# Patient Record
Sex: FEMALE | Race: BLACK OR AFRICAN AMERICAN | NOT HISPANIC OR LATINO | Employment: FULL TIME
[De-identification: names, ages, dates, MRNs, and addresses within clinical notes are randomized per-mention and may not be internally consistent; named-entity substitution may affect disease eponyms.]

---

## 2017-06-24 ENCOUNTER — HEALTH MAINTENANCE LETTER (OUTPATIENT)
Age: 47
End: 2017-06-24

## 2019-08-20 ENCOUNTER — TRANSFERRED RECORDS (OUTPATIENT)
Dept: MULTI SPECIALTY CLINIC | Facility: CLINIC | Age: 49
End: 2019-08-20

## 2019-10-02 ENCOUNTER — HEALTH MAINTENANCE LETTER (OUTPATIENT)
Age: 49
End: 2019-10-02

## 2020-06-22 ENCOUNTER — TELEPHONE (OUTPATIENT)
Dept: ENDOCRINOLOGY | Facility: CLINIC | Age: 50
End: 2020-06-22

## 2020-06-26 ENCOUNTER — TELEPHONE (OUTPATIENT)
Dept: ENDOCRINOLOGY | Facility: CLINIC | Age: 50
End: 2020-06-26

## 2020-06-26 NOTE — TELEPHONE ENCOUNTER
Called in regards to appts on Monday June 29th. Instructed patient to complete questionnaire on [employer] prior to visit.  [phone] as callback  Shauna Miranda EMT

## 2020-06-29 ENCOUNTER — VIRTUAL VISIT (OUTPATIENT)
Dept: ENDOCRINOLOGY | Facility: CLINIC | Age: 50
End: 2020-06-29
Payer: COMMERCIAL

## 2020-06-29 ENCOUNTER — VIRTUAL VISIT (OUTPATIENT)
Dept: SURGERY | Facility: CLINIC | Age: 50
End: 2020-06-29
Payer: COMMERCIAL

## 2020-06-29 VITALS — WEIGHT: 227 LBS | BODY MASS INDEX: 36.48 KG/M2 | HEIGHT: 66 IN

## 2020-06-29 DIAGNOSIS — E66.812 CLASS 2 SEVERE OBESITY WITH SERIOUS COMORBIDITY AND BODY MASS INDEX (BMI) OF 37.0 TO 37.9 IN ADULT, UNSPECIFIED OBESITY TYPE (H): Primary | ICD-10-CM

## 2020-06-29 DIAGNOSIS — E66.9 OBESITY (BMI 30-39.9): Primary | ICD-10-CM

## 2020-06-29 DIAGNOSIS — E66.01 CLASS 2 SEVERE OBESITY WITH SERIOUS COMORBIDITY AND BODY MASS INDEX (BMI) OF 37.0 TO 37.9 IN ADULT, UNSPECIFIED OBESITY TYPE (H): Primary | ICD-10-CM

## 2020-06-29 RX ORDER — TOPIRAMATE 25 MG/1
TABLET, FILM COATED ORAL
Qty: 90 TABLET | Refills: 3 | Status: SHIPPED | OUTPATIENT
Start: 2020-06-29 | End: 2020-09-14

## 2020-06-29 RX ORDER — LISINOPRIL AND HYDROCHLOROTHIAZIDE 12.5; 2 MG/1; MG/1
1 TABLET ORAL DAILY
COMMUNITY
Start: 2019-11-20 | End: 2024-02-06

## 2020-06-29 ASSESSMENT — PAIN SCALES - GENERAL: PAINLEVEL: NO PAIN (0)

## 2020-06-29 ASSESSMENT — MIFFLIN-ST. JEOR: SCORE: 1658.48

## 2020-06-29 NOTE — LETTER
"6/29/2020       RE: Melyssa Kirk  [address] [place] [address]     Dear Colleague,    Thank you for referring your patient, Melyssa Kirk, to the [facility] SURGICAL WEIGHT MANAGEMENT [facility]. Please see a copy of my visit note below.    Melyssa Kirk is a 49 year old female who is being evaluated via a billable telephone visit.        Phone call duration: 24 minutes    New Weight Management Nutrition Consultation    Melyssa Kirk is a 49 year old female presents today for new weight management nutrition consultation.  Patient referred by  on June 29, 2020.    Patient with Co-morbidities of obesity including:  Type II DM no  Renal Failure no  Sleep apnea no  Hypertension yes   Dyslipidemia no  Joint pain no  Back pain no  GERD no     Anthropometrics:  Estimated body mass index is 37.2 kg/m  as calculated from the following:    Height as of an earlier encounter on 6/29/20: 1.664 m (5' 5.5\").    Weight as of an earlier encounter on 6/29/20: 103 kg (227 lb).    Was around 170 lb: food addicts anonmym 3 meals a day no flour or sugar kept that off for 5 year.   2012 moved back to [place] gain ~70 lbs    Highest weigh was 350 lb (probably more stopped weighting herself)     Medications for Weight Loss:  Topamax    NUTRITION HISTORY  See MD note for details.    She reports consuming: 3 meals a day and a snack  Still weight and measure her food, example her dinner last night was 5 oz of salmon, 1 cup of brocolli and a cup of raw vegetable  1/4 brown rice.   She tracks her calories with [employer]itnesspal: ~1600 calories per day.     Recent food recall per MD  3 meals a day and 1 snack.   AM 8-9 am: protein (eggs) + yogurt, fruit, grain  lunch: prot, veg/salad, grain  Dinner 6:30 pm: Protein and veg  Snack: protein, or fruit (3 pm)     Physical Activity:  30-45 min (walk/jog) 3 days a week. Other days: cardio. + strength training. Just started back on yoga 2 times a " week.     MALNUTRITION  Telephone Visit: unable to perform NFPE  % Intake: Unable to assess  % Weight Loss: Unable to assess  Subcutaneous Fat Loss: None observed  Muscle Loss: None observed  Fluid Accumulation/Edema: None noted  Malnutrition Diagnosis: Patient does not meet two of the established criteria necessary for diagnosing malnutrition     Nutrition Prescription  Recommended energy/nutrient modification.  1500 calories/day (per MD)    Nutrition Diagnosis  Obesity r/t long history of self-monitoring deficit and excessive energy intake aeb BMI >30.    Nutrition Intervention  Materials/education provided:   1. Dicussed the volumetric diet  2. Patient asked about meal plans, dicussed the benefits and negatives of meal plans. Will give some meal ideas and plan options.   3. Provided encouragement and support.   4. AVS via [employer]    Patient Understanding: good  Expected Compliance: good  Follow-Up Plans: 1 month      Nutrition Goals  1) Follow 1500 calorie/day plan per MD  2) Skip afternoon snack    3) Continue with current activity level     Follow-Up:  1 month     Phone call contact time:   Call Started at: 1:33  Call Ended at: 1:57 PM    Time spent with patient: 24 minutes.  Rasheeda Calderon, MS, RD, LD

## 2020-06-29 NOTE — PROGRESS NOTES
"Melyssa Kirk is a 49 year old female who is being evaluated via a billable telephone visit.      The patient has been notified of following:     \"This telephone visit will be conducted via a call between you and your physician/provider. We have found that certain health care needs can be provided without the need for a physical exam.  This service lets us provide the care you need with a short phone conversation.  If a prescription is necessary we can send it directly to your pharmacy.  If lab work is needed we can place an order for that and you can then stop by our lab to have the test done at a later time.    Telephone visits are billed at different rates depending on your insurance coverage. During this emergency period, for some insurers they may be billed the same as an in-person visit.  Please reach out to your insurance provider with any questions.    If during the course of the call the physician/provider feels a telephone visit is not appropriate, you will not be charged for this service.\"    Patient has given verbal consent for Telephone visit?  Yes    What phone number would you like to be contacted at? [phone]    How would you like to [facility] your [facility]    Phone call duration: 24 minutes    New Weight Management Nutrition Consultation    Melyssa Kirk is a 49 year old female presents today for new weight management nutrition consultation.  Patient referred by  on June 29, 2020.    Patient with Co-morbidities of obesity including:  Type II DM no  Renal Failure no  Sleep apnea no  Hypertension yes   Dyslipidemia no  Joint pain no  Back pain no  GERD no     Anthropometrics:  Estimated body mass index is 37.2 kg/m  as calculated from the following:    Height as of an earlier encounter on 6/29/20: 1.664 m (5' 5.5\").    Weight as of an earlier encounter on 6/29/20: 103 kg (227 lb).    Was around 170 lb: food addicts anonmym 3 meals a day no flour or sugar kept that off for 5 year. "   2012 moved back to [place] gain ~70 lbs    Highest weigh was 350 lb (probably more stopped weighting herself)     Medications for Weight Loss:  Topamax    NUTRITION HISTORY  See MD note for details.    She reports consuming: 3 meals a day and a snack  Still weight and measure her food, example her dinner last night was 5 oz of salmon, 1 cup of brocolli and a cup of raw vegetable  1/4 brown rice.   She tracks her calories with [employer]: ~1600 calories per day.     Recent food recall per MD  3 meals a day and 1 snack.   AM 8-9 am: protein (eggs) + yogurt, fruit, grain  lunch: prot, veg/salad, grain  Dinner 6:30 pm: Protein and veg  Snack: protein, or fruit (3 pm)     Physical Activity:  30-45 min (walk/jog) 3 days a week. Other days: cardio. + strength training. Just started back on yoga 2 times a week.     MALNUTRITION  Telephone Visit: unable to perform NFPE  % Intake: Unable to assess  % Weight Loss: Unable to assess  Subcutaneous Fat Loss: None observed  Muscle Loss: None observed  Fluid Accumulation/Edema: None noted  Malnutrition Diagnosis: Patient does not meet two of the established criteria necessary for diagnosing malnutrition     Nutrition Prescription  Recommended energy/nutrient modification.  1500 calories/day (per MD)    Nutrition Diagnosis  Obesity r/t long history of self-monitoring deficit and excessive energy intake aeb BMI >30.    Nutrition Intervention  Materials/education provided:   1. Dicussed the volumetric diet  2. Patient asked about meal plans, dicussed the benefits and negatives of meal plans. Will give some meal ideas and plan options.   3. Provided encouragement and support.   4. AVS via [employer]    Patient Understanding: good  Expected Compliance: good  Follow-Up Plans: 1 month      Nutrition Goals  1) Follow 1500 calorie/day plan per MD  2) Skip afternoon snack    3) Continue with current activity level     Follow-Up:  1 month     Phone call contact time:   Call Started at: 1:33  Call  Ended at: 1:57 PM    Time spent with patient: 24 minutes.  Rasheeda Calderon, MS, RD, LD

## 2020-06-29 NOTE — LETTER
"2020       RE: Melyssa Kirk  [address] [place] [address]     Dear Colleague,    Thank you for referring your patient, Melyssa Kirk, to the [facility] MEDICAL WEIGHT MANAGEMENT at [facility]. Please see a copy of my visit note below.    Melyssa Kirk is a 49 year old female who is being evaluated via a billable video visit.      The patient has been notified of following:     \"This video visit will be conducted via a call between you and your physician/provider. We have found that certain health care needs can be provided without the need for an in-person physical exam.  This service lets us provide the care you need with a video conversation.  If a prescription is necessary we can send it directly to your pharmacy.  If lab work is needed we can place an order for that and you can then stop by our lab to have the test done at a later time.    Video visits are billed at different rates depending on your insurance coverage.  Please reach out to your insurance provider with any questions.    If during the course of the call the physician/provider feels a video visit is not appropriate, you will not be charged for this service.\"    Patient has given verbal consent for Video visit? Yes  How would you like to obtain your AVS? MyChart  Patient would like the video invitation sent by: Send to e-mail at: [email or web address]  Will anyone else be joining your video visit? No        Video-Visit Details    Type of service:  Video Visit    Video Start Time: 11:58 AM  Video End Time: 12:43    Originating Location (pt. Location): Home    Distant Location (provider location):   [facility]     Platform used for Video Visit: Gustavo Alejo MD        New Medical Weight Management Consult    PATIENT:  Melyssa Kirk  MRN:         [id number]  :         1970  MILEY: 19    Dear Laine Pradhan,    I had the pleasure of seeing " "your patient, Melyssa Kirk.  Full intake/assessment done to determine barriers to weight loss success and develop a treatment plan.  Melyssa Kirk is a 49 year old female interested in treatment of medical problems associated with weight.      Patient was referred by a friend who comes to this clinic.   She has had issues with her weight all her life. She was \"put on a diet\" by her family even in her childhood. Gained weight in college (got to 220 lbs). Then lost weight to 175 lbs. She then had two pregnancies that she decided to abort both of them, but she gained weight from both. She regained weight, and got to a max of 350 lbs or higher. She was in her late 20s. Then she moved from [place] to [place]. She started monitoring her diet and exercising. She lost down to 260 lbs. Stayed at that weight. Moved to [place], and was able to lost about 120 lbs over the course of a year there. She accomplished that with \"food addicts anonymous\". Went to support group meetings per week. She followed a meal plan and tracked her food daily. Weight went from 272 lbs down to 151 lbs. She maintained at 165-170 lbs for 5 years. Then she moved back to [place]. Started a stressful job. She joined [employer]. It was not the same. She did not get the same level of support. She gained 70 lbs within the year that she moved back ([id number]). She got to 265 lbs.   Now at 227 lbs at the present time.     At the current time, she has a strong support system.   She is a member of [employer].   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     Diet:   3 meals a day and 1 snack.   AM 8-9 am: protein (eggs) + yogurt, fruit, grain  lunch: prot, veg/salad, grain  Dinner 6:30 pm: Protein and veg  Snack: protein, or fruit (3 pm)     She measures her food, she uses [employer]. Typically gets about 1600 Kcal/day    Exercise: 30-45 min (walk/jog) 3 " "days a week. Other days: cardio. + strength training. Just started back on yoga 2 times a week.     She has HTN. Well controlled on lisinipril/HCTZ.       Her weight today is 227 lbs 0 oz, Body mass index is 37.2 kg/m ., and she has the following co-morbidities:     6/28/2020   I have the following health issues associated with obesity: High Blood Pressure   I have the following symptoms associated with obesity: Depression, Fatigue       Patient Goals 6/28/2020   I am interested in having a healthier weight to diminish current health problems: Yes   I am interested in having a healthier weight in order to prevent future health problems: Yes   I am interested in having a healthier weight in order to have a future surgery: No       Referring Provider 6/28/2020   Please name the provider who referred you to Medical Weight Management.  If you do not know, please answer: \"I Don't Know\". n/a referred by a friend Isabel Martinez       Wt Readings from Last 4 Encounters:   06/29/20 103 kg (227 lb)   05/19/15 105.7 kg (233 lb)       Weight History 6/28/2020   How concerned are you about your weight? Very Concerned   Would you describe your weight gain as gradual? No   I became overweight: In College   The following factors have contributed to my weight gain:  Stress, Other   Please list the other factors.  My most recent weight gain, was after losing 120lbs in 2008, I started a 12 step recovery program [employer]icts annonymous. I kept the weight off until 2013 after moving back to [place] from [place] dealt with a lot of racism on my job.   I have tried the following methods to lose weight: Watching Portions or Calories, Exercise, Weight Watchers, Atkins-type Diet (Low Carb/High Protein), Other   Please list the other methods.  12 Step Recovery, Food Addicts Anonymous, I currently am in Overeaters Anonymous, I have been following a similar diet that I had when I lost 120lbs but I have only been able to release 20lbs in the last " year.   My lowest weight since age 17 was: 151   My highest weight since age 17 was: 350   The most weight I have ever lost was: (lbs) 120   I have the following family history of obesity/being overweight:  I am the only one in my immediate family who is overweight, One or more of my siblings are overweight, Many of my relatives are overweight   Has anyone in your family had weight loss surgery? No   How has your weight changed over the last year?  Lost   How many pounds? 20       Diet Recall 6/28/2020   Glass juice/day 0   Glass milk/day 0   Glass sugary drink/day 0   How many cans/bottles of sugar pop/soda/tea/sports drinks do you drink in a day? 0   Diet drink/day 0   Alcohol Never       Eating Habits 6/28/2020   Generally, my meals include foods like these: bread, pasta, rice, potatoes, corn, crackers, sweet dessert, pop, or juice. A Few Times a Week   Generally, my meals include foods like these: fried meats, brats, burgers, french fries, pizza, cheese, chips, or ice cream. Less Than Weekly   Eat fast food (like McDonalds, [employer], Taco Bell). Never   Eat at a buffet or sit-down restaurant. Never   Eat most of my meals in front of the TV or computer. A Few Times a Week   Often skip meals, eat at random times, have no regular eating times. Less Than Weekly   Rarely sit down for a meal but snack or graze throughout.  Never   Eat extra snacks between meals. Never   Eat most of my food at the end of the day. Never   Eat in the middle of the night or wake up at night to eat. Never   Eat extra snacks to prevent or correct low blood sugar. Never   Eat to prevent acid reflux or stomach pain. Never   Worry about not having enough food to eat. Never   Have you been to the food shelf at least a few times this year? No   I eat when I am depressed. Never   I eat when I am stressed. Less Than Weekly   I eat when I am bored. Never   I eat when I am anxious. Never   I eat when I am happy or as a reward. Never   I feel  hungry all the time even if I just have eaten. Never   Feeling full is important to me. Never   I finish all the food on my plate even if I am already full. Never   I can't resist eating delicious food or walk past the good food/smell. Never   I eat/snack without noticing that I am eating. Never   I eat when I am preparing the meal. Never   I eat more than usual when I see others eating. Never   I have trouble not eating sweets, ice cream, cookies, or chips if they are around the house. Never   I think about food all day. Never   What foods, if any, do you crave? Chips/Crackers   Please list any other foods you crave? Fried chicken       Activity/Exercise History 6/28/2020   How much of a typical 12 hour day do you spend sitting? Most of the Day   How much of a typical 12 hour day do you spend lying down? Less Than Half the Day   How much of a typical day do you spend walking/standing? Less Than Half the Day   How many hours (not including work) do you spend on the TV/Video Games/Computer/Tablet/Phone? 2-3 Hours   How many times a week are you active for the purpose of exercise? 4-5 TImes a Week   What keeps you from being more active? Other   How many total minutes do you spend doing some activity for the purpose of exercising when you exercise? More Than 30 Minutes       ROS    PAST MEDICAL HISTORY:  No past medical history on file.    Work/Social History Reviewed With Patient 6/28/2020   My employment status is: Full-Time   My job is: , , Consultant, [employer]   How much of your job is spent on the computer or phone? 75%   How many hours do you spend commuting to work daily?  Prior to COVID about 2 hours a week 50% was virtual   What is your marital status? /In a Relationship   If in a relationship, is your significant other overweight? No   Do you have children? No   If you have children, are they overweight? No   Who do you live with?  My partner   Are they supportive of your health  "goals? Yes   Who does the food shopping?  We both do       Mental Health History Reviewed With Patient 6/28/2020   Have you ever been physically or sexually abused? No   If yes, do you feel that the abuse is affecting your weight? N/A   If yes, would you like to talk to a counselor about the abuse? N/A   How often in the past 2 weeks have you felt little interest or pleasure in doing things? For Several Days   Over the past 2 weeks how often have you felt down, depressed, or hopeless? For Several Days       Sleep History Reviewed With Patient 6/28/2020   How many hours do you sleep at night? 6.5   Do you think that you snore loudly or has anybody ever heard you snore loudly (louder than talking or so loud it can be heard behind a shut door)? No   Has anyone seen or heard you stop breathing during your sleep? No   Do you often feel tired, fatigued, or sleepy during the day? Yes   Do you have a TV/Computer in your bedroom? No       MEDICATIONS:   Current Outpatient Medications   Medication Sig Dispense Refill     lisinopril-hydrochlorothiazide (ZESTORETIC) 20-12.5 MG tablet Take 1 tablet by mouth         ALLERGIES:   No Known Allergies    PHYSICAL EXAM:  Ht 1.664 m (5' 5.5\")   Wt 103 kg (227 lb)   BMI 37.20 kg/m     A & O x 3  HEENT: NCAT, mucous membranes moist  Respirations unlabored  Location of obesity: Central Obesity    ASSESSMENT:  Melyssa is a patient with early onset obesity with significant element of familial/genetic influence and with current health consequences. She does need aggressive weight loss plan due to HTN.      Her problem is complicated by stress.    PLAN:    Diet:   - reduce calories to <1500 Kcal/day  - skip afternoon snack  - eat between 10-6    Strong genetic or hunger disorder  Medication:  The patient will begin medication in pursuit of improved medical status as influenced by body weight. She will start topiramate. Patient was made aware that topiramate is not approved for the treatment of " obesity.  There is a mutual understanding of the goals and risks of this therapy. The patient is in agreement. She is educated on dosage regimen and possible side effects.        RTC:  4 weeks with MTM  8 weeks with me.     TIME: 45 min spent on evaluation, management, counseling, education, & motivational interviewing with greater than 50 % of the total time was spent on counseling and coordinating care    Sincerely,    Teressa Alejo MD

## 2020-06-29 NOTE — NURSING NOTE
"(   Chief Complaint   Patient presents with     Weight Problem     new MWM    )    ( Weight: 103 kg (227 lb)(per pt) )  ( Height: 166.4 cm (5' 5.5\") )  ( BMI (Calculated): 37.2 )  (   )  ( Cumulative weight loss (lbs): 0 )  (   )  (   )  (   )  (   )    (   )  (   )  (   )  (   )  (   )  (   )  (   )    (   Patient Active Problem List   Diagnosis   (none) - all problems resolved or deleted    )  (   Current Outpatient Medications   Medication Sig Dispense Refill     lisinopril-hydrochlorothiazide (ZESTORETIC) 20-12.5 MG tablet Take 1 tablet by mouth      )  ( Diabetes Eval:    )    ( Pain Eval:  No Pain (0) )    ( Wound Eval:       )    (   History   Smoking Status     Never Smoker   Smokeless Tobacco     Never Used    )    ( Signed By:  Toño Avalos, EMT; June 29, 2020; 11:33 AM )   "

## 2020-06-29 NOTE — PATIENT INSTRUCTIONS
MEDICATION STARTED AT THIS APPOINTMENT  We are starting topiramate at bedtime.  Start one tab, 25 mg, for a week. Go up to 50 mg (2 tabs) for the next week. At the third week, take   3 tabs (75 mg).  Stay at 3 tabs until you are seen again. Call the nurse at [phone] if you have any questions or concerns. (Do not stop taking it if you don't think it's working. For some people it works even though they do not feel much different.)    Topiramate (Topamax) is a medication that is used most often to treat migraine headaches or for seizures. It has also been found to help with weight loss. Although it's not currently FDA approved for weight loss, it has been used safely for a number of years to help people who are carrying extra weight.     Just how topiramate helps with weight loss has not been exactly determined. However it seems to work on areas of the brain to quiet down signals related to eating.      Topiramate may make you:    >feel less interest in eating in between meals   >think less about food and eating   >find it easier to push the plate away   >find giving up pop easier    >have an easier time eating less    For some of our patients, the pills work right away. They feel and think quite differently about food. Other patients don't feel much of a change but find in fact they have lost weight! Like all weight loss medications, topiramate works best when you help it work.  This means:    1) Have less tempting high calorie (fattening) food around the house or office    2) Have lower calorie food (fruits, vegetables,low fat meats and dairy) for snacks    3) Eat out only one time or less each week.   4) Eat your meals at a table with the TV or computer off.    Side-effects. Topiramate is generally well tolerated. The main side-effects we see are:   Tingling in hands,feet, or face (usually not very troublesome)   Mental confusion and word finding trouble (about 10% of patients have this.)     Feeling sleepy  or a bit dopey- this goes away very soon after starting.    One of the dangers of topiramate is the possibility of birth defects--if you get pregnant when you are on it, there is the risk that your baby will be born with a cleft lip or palate.  If you are on topiramate and of child bearing age, you need to be on a reliable form of birth control or refrain from sexual intercourse.     Please refer to the pharmacy insert for more information on side-effects. Since many pharmacists are not familiar with the use of topiramate in weight loss, calling the clinic will get you the most accurate information on the use of this medication for weight loss.     In order to get refills of this or any medication we prescribe you must be seen in the medical weight mgmt clinic every 2-3 months. Please have your pharmacy fax a refill request to [phone].

## 2020-06-29 NOTE — PROGRESS NOTES
"Melyssa Kirk is a 49 year old female who is being evaluated via a billable video visit.      The patient has been notified of following:     \"This video visit will be conducted via a call between you and your physician/provider. We have found that certain health care needs can be provided without the need for an in-person physical exam.  This service lets us provide the care you need with a video conversation.  If a prescription is necessary we can send it directly to your pharmacy.  If lab work is needed we can place an order for that and you can then stop by our lab to have the test done at a later time.    Video visits are billed at different rates depending on your insurance coverage.  Please reach out to your insurance provider with any questions.    If during the course of the call the physician/provider feels a video visit is not appropriate, you will not be charged for this service.\"    Patient has given verbal consent for Video visit? Yes  How would you like to obtain your AVS? MyChart  Patient would like the video invitation sent by: Send to e-mail at: [email or web address]  Will anyone else be joining your video visit? No        Video-Visit Details    Type of service:  Video Visit    Video Start Time: 11:58 AM  Video End Time: 12:43    Originating Location (pt. Location): Home    Distant Location (provider location):  [facility]     Platform used for Video Visit: Gustavo Alejo MD        [facility] Weight Management Consult    PATIENT:  Melyssa Kirk  MRN:         [id number]  :         1970  MILEY: 20    Dear Laine Pradhan,    I had the pleasure of seeing your patient, Melyssa Kirk.  Full intake/assessment done to determine barriers to weight loss success and develop a treatment plan.  Melyssa Kirk is a 49 year old female interested in treatment of medical problems associated with weight.      Patient was referred by a friend who " "comes to this clinic.   She has had issues with her weight all her life. She was \"put on a diet\" by her family even in her childhood. Gained weight in college (got to 220 lbs). Then lost weight to 175 lbs. She then had two pregnancies that she decided to abort both of them, but she gained weight from both. She regained weight, and got to a max of 350 lbs or higher. She was in her late 20s. Then she moved from [place] to [place]. She started monitoring her diet and exercising. She lost down to 260 lbs. Stayed at that weight. Moved to [place], and was able to lost about 120 lbs over the course of a year there. She accomplished that with \"food addicts anonymous\". Went to support group meetings per week. She followed a meal plan and tracked her food daily. Weight went from 272 lbs down to 151 lbs. She maintained at 165-170 lbs for 5 years. Then she moved back to [place]. Started a stressful job. She joined [employer]. It was not the same. She did not get the same level of support. She gained 70 lbs within the year that she moved back (5420-1311). She got to 265 lbs.   Now at 227 lbs at the present time.     At the current time, she has a strong support system.   She is a member of [employer].   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 She has been very busy recently with recent unrest in the city relating to the [employer] protests.     Diet:   3 meals a day and 1 snack.   AM 8-9 am: protein (eggs) + yogurt, fruit, grain  lunch: prot, veg/salad, grain  Dinner 6:30 pm: Protein and veg  Snack: protein, or fruit (3 pm)     She measures her food, she uses myfitnesspal. Typically gets about 1600 Kcal/day    Exercise: 30-45 min (walk/jog) 3 days a week. Other days: cardio. + strength training. Just started back on yoga 2 times a week.     She has HTN. Well controlled on lisinipril/HCTZ.       Her weight today is 227 lbs 0 oz, Body mass index is 37.2 kg/m ., and she has the following co-morbidities:     6/28/2020   I have " "the following health issues associated with obesity: High Blood Pressure   I have the following symptoms associated with obesity: Depression, Fatigue       Patient Goals 6/28/2020   I am interested in having a healthier weight to diminish current health problems: Yes   I am interested in having a healthier weight in order to prevent future health problems: Yes   I am interested in having a healthier weight in order to have a future surgery: No       Referring Provider 6/28/2020   Please name the provider who referred you to Medical Weight Management.  If you do not know, please answer: \"I Don't Know\". n/a referred by a friend Isabel Martinez       Wt Readings from Last 4 Encounters:   06/29/20 103 kg (227 lb)   05/19/15 105.7 kg (233 lb)       Weight History 6/28/2020   How concerned are you about your weight? Very Concerned   Would you describe your weight gain as gradual? No   I became overweight: In College   The following factors have contributed to my weight gain:  Stress, Other   Please list the other factors.  My most recent weight gain, was after losing 120lbs in 2008, I started a 12 step recovery program [employer]. I kept the weight off until 2013 after moving back to [place] from [place] dealt with a lot of racism on my job.   I have tried the following methods to lose weight: Watching Portions or Calories, Exercise, Weight Watchers, Atkins-type Diet (Low Carb/High Protein), Other   Please list the other methods.  12 Step Recovery, [employer], I currently am in Overeaters [employer], I have been following a similar diet that I had when I lost 120lbs but I have only been able to release 20lbs in the last year.   My lowest weight since age 17 was: 151   My highest weight since age 17 was: 350   The most weight I have ever lost was: (lbs) 120   I have the following family history of obesity/being overweight:  I am the only one in my immediate family who is overweight, One or more of my " siblings are overweight, Many of my relatives are overweight   Has anyone in your family had weight loss surgery? No   How has your weight changed over the last year?  Lost   How many pounds? 20       Diet Recall 6/28/2020   Glass juice/day 0   Glass milk/day 0   Glass sugary drink/day 0   How many cans/bottles of sugar pop/soda/tea/sports drinks do you drink in a day? 0   Diet drink/day 0   Alcohol Never       Eating Habits 6/28/2020   Generally, my meals include foods like these: bread, pasta, rice, potatoes, corn, crackers, sweet dessert, pop, or juice. A Few Times a Week   Generally, my meals include foods like these: fried meats, brats, burgers, french fries, pizza, cheese, chips, or ice cream. Less Than Weekly   Eat fast food (like McDonalds, [employer], [email or web address]). Never   Eat at a buffet or sit-down restaurant. Never   Eat most of my meals in front of the TV or computer. A Few Times a Week   Often skip meals, eat at random times, have no regular eating times. Less Than Weekly   Rarely sit down for a meal but snack or graze throughout.  Never   Eat extra snacks between meals. Never   Eat most of my food at the end of the day. Never   Eat in the middle of the night or wake up at night to eat. Never   Eat extra snacks to prevent or correct low blood sugar. Never   Eat to prevent acid reflux or stomach pain. Never   Worry about not having enough food to eat. Never   Have you been to the food shelf at least a few times this year? No   I eat when I am depressed. Never   I eat when I am stressed. Less Than Weekly   I eat when I am bored. Never   I eat when I am anxious. Never   I eat when I am happy or as a reward. Never   I feel hungry all the time even if I just have eaten. Never   Feeling full is important to me. Never   I finish all the food on my plate even if I am already full. Never   I can't resist eating delicious food or walk past the good food/smell. Never   I eat/snack without noticing that I am  eating. Never   I eat when I am preparing the meal. Never   I eat more than usual when I see others eating. Never   I have trouble not eating sweets, ice cream, cookies, or chips if they are around the house. Never   I think about food all day. Never   What foods, if any, do you crave? Chips/Crackers   Please list any other foods you crave? Fried chicken       Activity/Exercise History 6/28/2020   How much of a typical 12 hour day do you spend sitting? Most of the Day   How much of a typical 12 hour day do you spend lying down? Less Than Half the Day   How much of a typical day do you spend walking/standing? Less Than Half the Day   How many hours (not including work) do you spend on the TV/Video Games/Computer/Tablet/Phone? 2-3 Hours   How many times a week are you active for the purpose of exercise? 4-5 TImes a Week   What keeps you from being more active? Other   How many total minutes do you spend doing some activity for the purpose of exercising when you exercise? More Than 30 Minutes       ROS    PAST MEDICAL HISTORY:  No past medical history on file.    Work/Social History Reviewed With Patient 6/28/2020   My employment status is: Full-Time   My job is: , , Consultant, [employer]   How much of your job is spent on the computer or phone? 75%   How many hours do you spend commuting to work daily?  Prior to COVID about 2 hours a week 50% was virtual   What is your marital status? /In a Relationship   If in a relationship, is your significant other overweight? No   Do you have children? No   If you have children, are they overweight? No   Who do you live with?  My partner   Are they supportive of your health goals? Yes   Who does the food shopping?  We both do       Mental Health History Reviewed With Patient 6/28/2020   Have you ever been physically or sexually abused? No   If yes, do you feel that the abuse is affecting your weight? N/A   If yes, would you like to talk to a counselor  "about the abuse? N/A   How often in the past 2 weeks have you felt little interest or pleasure in doing things? For Several Days   Over the past 2 weeks how often have you felt down, depressed, or hopeless? For Several Days       Sleep History Reviewed With Patient 6/28/2020   How many hours do you sleep at night? 6.5   Do you think that you snore loudly or has anybody ever heard you snore loudly (louder than talking or so loud it can be heard behind a shut door)? No   Has anyone seen or heard you stop breathing during your sleep? No   Do you often feel tired, fatigued, or sleepy during the day? Yes   Do you have a TV/Computer in your bedroom? No       MEDICATIONS:   Current Outpatient Medications   Medication Sig Dispense Refill     lisinopril-hydrochlorothiazide (ZESTORETIC) 20-12.5 MG tablet Take 1 tablet by mouth         ALLERGIES:   No Known Allergies    PHYSICAL EXAM:  Ht 1.664 m (5' 5.5\")   Wt 103 kg (227 lb)   BMI 37.20 kg/m     A & O x 3  HEENT: NCAT, mucous membranes moist  Respirations unlabored  Location of obesity: Central Obesity    ASSESSMENT:  Melyssa is a patient with early onset obesity with significant element of familial/genetic influence and with current health consequences. She does need aggressive weight loss plan due to HTN.      Her problem is complicated by stress.    PLAN:    Diet:   - reduce calories to <1500 Kcal/day  - skip afternoon snack  - eat between 10-6    Strong genetic or hunger disorder  Medication:  The patient will begin medication in pursuit of improved medical status as influenced by body weight. She will start topiramate. Patient was made aware that topiramate is not approved for the treatment of obesity.  There is a mutual understanding of the goals and risks of this therapy. The patient is in agreement. She is educated on dosage regimen and possible side effects.        RTC:  4 weeks with MTM  8 weeks with me.     TIME: 45 min spent on evaluation, management, counseling, " education, & motivational interviewing with greater than 50 % of the total time was spent on counseling and coordinating care    Sincerely,    Teressa Alejo MD

## 2020-07-01 ENCOUNTER — TELEPHONE (OUTPATIENT)
Dept: ENDOCRINOLOGY | Facility: CLINIC | Age: 50
End: 2020-07-01

## 2020-07-01 NOTE — TELEPHONE ENCOUNTER
MTM referral from: [facility] visit (referral by provider)    MTM referral outreach attempt #2 on July 1, 2020 at 1:05 PM      Outcome: Patient not reachable after several attempts, will route to MTM Pharmacist/Provider as an FYI. Thank you for the referral.    Lisette Davis, MTM Coordinator

## 2020-07-22 NOTE — PATIENT INSTRUCTIONS
Nutrition Goals  1) Follow 1500 calorie/day plan per MD  2) Skip afternoon snack    3) Continue with current activity level     Follow up with RD in one month    Rasheeda Calderon, MS, RD, LD  If you need to schedule or reschedule with a dietitian please call [phone].

## 2020-08-19 ENCOUNTER — ALLIED HEALTH/NURSE VISIT (OUTPATIENT)
Dept: PHARMACY | Facility: CLINIC | Age: 50
End: 2020-08-19
Attending: INTERNAL MEDICINE
Payer: COMMERCIAL

## 2020-08-19 DIAGNOSIS — E66.01 CLASS 2 SEVERE OBESITY DUE TO EXCESS CALORIES WITH SERIOUS COMORBIDITY AND BODY MASS INDEX (BMI) OF 37.0 TO 37.9 IN ADULT (H): Primary | ICD-10-CM

## 2020-08-19 DIAGNOSIS — I10 BENIGN ESSENTIAL HYPERTENSION: ICD-10-CM

## 2020-08-19 DIAGNOSIS — E66.812 CLASS 2 SEVERE OBESITY DUE TO EXCESS CALORIES WITH SERIOUS COMORBIDITY AND BODY MASS INDEX (BMI) OF 37.0 TO 37.9 IN ADULT (H): Primary | ICD-10-CM

## 2020-08-19 PROCEDURE — 99607 MTMS BY PHARM ADDL 15 MIN: CPT | Performed by: PHARMACIST

## 2020-08-19 PROCEDURE — 99605 MTMS BY PHARM NP 15 MIN: CPT | Performed by: PHARMACIST

## 2020-08-19 NOTE — PROGRESS NOTES
MTM ENCOUNTER  SUBJECTIVE/OBJECTIVE:                           Melyssa Kirk is a 50 year old female called for an initial visit. She was referred to me from Dr. Alejo.      Patient consented to a telehealth visit: yes  Telemedicine Visit Details  Type of service:  Telephone visit  Start Time: 9:32 AM  End Time: 10:00 AM  Originating Location (pt. Location): Home  Distant Location (provider location):  [facility] [place]  Mode of Communication:  Telephone    Chief Complaint: topiramate start check in.    Allergies/ADRs: Reviewed in EHR  Tobacco:  reports that she has never smoked. She has never used smokeless tobacco.  Alcohol: not currently using  Caffeine: no caffeine  Activity: see below   PMH: Reviewed in EHR    Medication Adherence/Access: no issues reported    Obesity:   Topiramate 75 mg once daily.     Followed by Dr. Teressa Alejo, seen 6/29/2020 for [facility], was started on topiramate. Patient is experiencing the follow side effects: paraesthesias and fatigue at first but now stopped/improved.    Weight History: See Dr. Alejo's note from 6/29 for weight history in detail. She did have greater weight gain when moving from from [place] to [place]. Over the last year, she has been a part of overeaters anonymous, has been working with nutritionist, had been losing weight 25-30 lb on a food plan but then weight stalled.   Diet/Eating Habits: Patient reports historically had been eating 3 meals per day + snack, so the goal was to limit snacking and eating 3 meals per day. Started intermittent fasting, 12 pm to 8 pm. She found she is more likely to eat 2 meals per day + snack and is full with this. Getting full quickly, portions smaller - historically was a large volume eater. Meals encompass protein + vegetable +/- 1-2 grains (3 oz) per day. Snacks: fruit - Nuts or seeds for snack.    Exercise/Activity: Patient reports running/walk 30 minutes in AM 3 times per week. Then do  "yoga 1-2 times per week. Recreationally she will bike every so often. She has been an \"over exerciser\" before, so trying to exercise more in a healthy way.     Current weight today: 214 lb   Initial Consult Weight 6/29/2020: 227 lb  Cumulative Weight Loss: -13 lb   Wt Readings from Last 4 Encounters:   06/29/20 227 lb (103 kg)   05/19/15 233 lb (105.7 kg)     Estimated body mass index is 37.2 kg/m  as calculated from the following:    Height as of 6/29/20: 5' 5.5\" (1.664 m).    Weight as of 6/29/20: 227 lb (103 kg).    Hypertension:   Lisinopril-hydrochlorothiazide 20-12.5 mg daily in AM.      Patient does not self-monitor BP. Does not have blood pressure monitor at home. Patient reports no current medication side effects. No reports of lightheadedness/dizziness. No headaches or blurred vision.     ASSESSMENT:                            Medication Adherence: good, no issues identified    Obesity: Progressing, no changes at this time. Would benefit from BMP repeat as currently on 3 different medications that can impact kidney function/potassium.     Hypertension: Status unknown. Would benefit from getting BP repeat.     PLAN:                            1. Get BMP repeat.  Can go to [facility] [place] - call to schedule lab - [phone]  OR   [facility] to get lab AND blood pressure checked - [phone] to schedule  -Locations: [place], [place], [place], [facility], or [place].    2. Continue current regimen otherwise.     I spent 28 minutes with this patient today. A copy of the visit note was provided to the patient's referring provider.    Will follow up in 1 month.    The patient was given a summary of these recommendations.     Lauren Bloch, PharmD  Medication Therapy Management Pharmacist   [facility]   Phone: [phone]              "

## 2020-08-19 NOTE — Clinical Note
Has had good weight loss this first month. I ordered a lab for repeat to check in on kidenys and electrolytes since its has been a while and also on lisinopril/hydrochlorothiazide. She also should get a BP check so I suggested she go to curbside clinic to get lab and BP taken. Re SANTACRUZ

## 2020-09-09 ENCOUNTER — TELEPHONE (OUTPATIENT)
Dept: PHARMACY | Facility: CLINIC | Age: 50
End: 2020-09-09

## 2020-09-09 NOTE — TELEPHONE ENCOUNTER
Called and left message for patient in regards to lab orders. Advised that lab order does not  until 2021. Gave scheduling number for [facility] clinic/lab. Also gave contact center number if patient has further questions.

## 2020-09-09 NOTE — TELEPHONE ENCOUNTER
Re,  Pt forgot about her labs, order , but she still wants to do them.  Can someone call or msg her when complete so she can schedule.    [phone]  **OK to leave detailed VM

## 2020-09-14 ENCOUNTER — VIRTUAL VISIT (OUTPATIENT)
Dept: ENDOCRINOLOGY | Facility: CLINIC | Age: 50
End: 2020-09-14
Payer: COMMERCIAL

## 2020-09-14 VITALS — BODY MASS INDEX: 34.49 KG/M2 | WEIGHT: 214.6 LBS | HEIGHT: 66 IN

## 2020-09-14 DIAGNOSIS — E66.812 CLASS 2 SEVERE OBESITY WITH SERIOUS COMORBIDITY AND BODY MASS INDEX (BMI) OF 37.0 TO 37.9 IN ADULT, UNSPECIFIED OBESITY TYPE (H): Primary | ICD-10-CM

## 2020-09-14 DIAGNOSIS — E66.01 CLASS 2 SEVERE OBESITY WITH SERIOUS COMORBIDITY AND BODY MASS INDEX (BMI) OF 37.0 TO 37.9 IN ADULT, UNSPECIFIED OBESITY TYPE (H): Primary | ICD-10-CM

## 2020-09-14 RX ORDER — TOPIRAMATE 25 MG/1
50 TABLET, FILM COATED ORAL 2 TIMES DAILY
Qty: 180 TABLET | Refills: 1 | Status: SHIPPED | OUTPATIENT
Start: 2020-09-14 | End: 2020-12-31

## 2020-09-14 ASSESSMENT — MIFFLIN-ST. JEOR: SCORE: 1597.42

## 2020-09-14 ASSESSMENT — PAIN SCALES - GENERAL: PAINLEVEL: NO PAIN (0)

## 2020-09-14 NOTE — PROGRESS NOTES
"Melyssa Kirk is a 50 year old female who is being evaluated via a billable video visit.      The patient has been notified of following:     \"This video visit will be conducted via a call between you and your physician/provider. We have found that certain health care needs can be provided without the need for an in-person physical exam.  This service lets us provide the care you need with a video conversation.  If a prescription is necessary we can send it directly to your pharmacy.  If lab work is needed we can place an order for that and you can then stop by our lab to have the test done at a later time.    Video visits are billed at different rates depending on your insurance coverage.  Please reach out to your insurance provider with any questions.    If during the course of the call the physician/provider feels a video visit is not appropriate, you will not be charged for this service.\"    Patient has given verbal consent for Video visit? Yes  How would you like to obtain your AVS? MyChart  If you are dropped from the video visit, the video invite should be resent to: Text to cell phone: [phone]  Will anyone else be joining your video visit? No    During this virtual visit the patient is located in [place], patient verifies this as the location during the entirety of this visit.     Video-Visit Details    Type of service:  Video Visit    Video Start Time: 10:46 AM  Video End Time: 11:10    Originating Location (pt. Location): Home    Distant Location (provider location):  [facility] MEDICAL WEIGHT MANAGEMENT     Platform used for Video Visit: [employer]    Teressa Alejo MD        [facility] Medical Weight Management Note     Melyssa Kirk  MRN:  [id number]  :  1970    Dear Laine Pradhan,    I had the pleasure of seeing your patient Melyssa Kirk.  She is a 50 year old female who I am continuing to see for treatment of obesity related to:       2020   I have the following health issues " "associated with obesity: High Blood Pressure   I have the following symptoms associated with obesity: Depression, Fatigue       INTERVAL HISTORY:  Initial visit with me on 6/29/2020. Weight then was 227 lbs.   At that point, I started her on topiramate.   She is down 13 lbs in the past 8 weeks.     Diet:   Intermittent fasting. Eats between 12-8 most days of the week.   Logging her food intake: using myfitness pal. She is mostly staying under 1500 Kcal most of the time. She is mostly 0412-3264 Kcal.     Exercise: 3 days/week. Runs and jogs. Some yoga.     Meds:   Topiramate 75 mg po at bedtime.   She finds that it has decreased her appetite.   No side effects. Some hunger in mid-afternoon.     CURRENT WEIGHT:   214 lbs 9.6 oz    Wt Readings from Last 4 Encounters:   09/14/20 97.3 kg (214 lb 9.6 oz)   06/29/20 103 kg (227 lb)   05/19/15 105.7 kg (233 lb)       Height:  5' 5.512\"  Body Mass Index:  Body mass index is 35.16 kg/m .  Vitals:  B/P: Data Unavailable, P: Data Unavailable, R: Data Unavailable     Initial consult weight was 227 on 6/29/2020.  Weight change since last seen on 6/29/2020 is down 13 pounds.   Total loss is 13 pounds.    Diet Recall Review with Patient 6/28/2020   Do you typically eat breakfast? Yes   If you do eat breakfast, what types of food do you eat? eggs, yogurt, fruit, smoothie, avocado toast on simba bread, grits, oatmeal   Do you typically eat lunch? Yes   If you do eat lunch, what types of food do you typically eat?  4-5 oz of protein, hot veggies, salad, and occassional grain 1/4 cup of brown rice or sweet potatoes   Do you typically eat supper? Yes   If you do eat supper, what types of food do you typically eat? 4-5 oz of protein, hot veggies, salad,  occassionly 1/4 cup of grain   Do you typically eat snacks? Yes   If you do snack, what types of food do you typically eat? 2 oz of protein, 1 cup of veggies , 1 tablespoon of almond butter fruit   Do you like vegetables?  No   Do you " drink water? Yes   How many glasses of juice do you drink in a typical day? 0   How many of glasses of milk do you drink in a typical day? 0   If you do drink milk, what type? N/A   How many 8oz glasses of sugar containing drinks such as Alex-Aid/sweet tea do you drink in a day? 0   How many cans/bottles of sugar pop/soda/tea/sports drinks do you drink in a day? 0   How many cans/bottles of diet pop/soda/tea or sports drink do you drink in a day? 0   How often do you have a drink of alcohol? Never       ROS    MEDICATIONS:   Current Outpatient Medications   Medication     lisinopril-hydrochlorothiazide (ZESTORETIC) 20-12.5 MG tablet     topiramate (TOPAMAX) 25 MG tablet     No current facility-administered medications for this visit.        Weight Loss Medication History Reviewed With Patient 9/14/2020   Which weight loss medications are you currently taking on a regular basis?  Topamax (topiramate)   Are you having any side effects from the weight loss medication that we have prescribed you? Yes   If you are having side effects please describe: fatigue       ASSESSMENT:   Obesity.   Good success with weight loss so far    PLAN:   Continue same plan with diet: IF and tracking calories  Continue exercise  Continue topiramate but switch to 50 mg BID.      FOLLOW-UP:    8 weeks.    Time: 25 min spent on evaluation, management, counseling, education, & motivational interviewing with greater than 50 % of the total time was spent on counseling and coordinating care    Sincerely,    Teressa Alejo MD

## 2020-09-14 NOTE — LETTER
"9/14/2020       RE: Melyssa Kirk  [address]  [place] [address]     Dear Colleague,    Thank you for referring your patient, Melyssa Kirk, to the [facility] MEDICAL WEIGHT MANAGEMENT at [facility]. Please see a copy of my visit note below.    Melyssa Kirk is a 50 year old female who is being evaluated via a billable video visit.      The patient has been notified of following:     \"This video visit will be conducted via a call between you and your physician/provider. We have found that certain health care needs can be provided without the need for an in-person physical exam.  This service lets us provide the care you need with a video conversation.  If a prescription is necessary we can send it directly to your pharmacy.  If lab work is needed we can place an order for that and you can then stop by our lab to have the test done at a later time.    Video visits are billed at different rates depending on your insurance coverage.  Please reach out to your insurance provider with any questions.    If during the course of the call the physician/provider feels a video visit is not appropriate, you will not be charged for this service.\"    Patient has given verbal consent for Video visit? Yes  How would you like to obtain your AVS? MyChart  If you are dropped from the video visit, the video invite should be resent to: Text to cell phone: [phone]  Will anyone else be joining your video visit? No    During this virtual visit the patient is located in [place], patient verifies this as the location during the entirety of this visit.     Video-Visit Details    Type of service:  Video Visit    Video Start Time: 10:46 AM  Video End Time: 11:10    Originating Location (pt. Location): Home    Distant Location (provider location):  [facility] MEDICAL WEIGHT MANAGEMENT     Platform used for Video Visit: Gustavo Alejo MD        [facility] Medical Weight Management Note   " "  Melyssa Kirk  MRN:  [id number]  :  1970    Dear Laine Pradhan,    I had the pleasure of seeing your patient Melyssa Kirk.  She is a 50 year old female who I am continuing to see for treatment of obesity related to:       2020   I have the following health issues associated with obesity: High Blood Pressure   I have the following symptoms associated with obesity: Depression, Fatigue       INTERVAL HISTORY:  Initial visit with me on 2020. Weight then was 227 lbs.   At that point, I started her on topiramate.   She is down 13 lbs in the past 8 weeks.     Diet:   Intermittent fasting. Eats between 12-8 most days of the week.   Logging her food intake: using myfitness pal. She is mostly staying under 1500 Kcal most of the time. She is mostly 5910-5288 Kcal.     Exercise: 3 days/week. Runs and jogs. Some yoga.     Meds:   Topiramate 75 mg po at bedtime.   She finds that it has decreased her appetite.   No side effects. Some hunger in mid-afternoon.     CURRENT WEIGHT:   214 lbs 9.6 oz    Wt Readings from Last 4 Encounters:   20 97.3 kg (214 lb 9.6 oz)   20 103 kg (227 lb)   05/19/15 105.7 kg (233 lb)       Height:  5' 5.512\"  Body Mass Index:  Body mass index is 35.16 kg/m .  Vitals:  B/P: Data Unavailable, P: Data Unavailable, R: Data Unavailable     Initial consult weight was 227 on 2020.  Weight change since last seen on 2020 is down 13 pounds.   Total loss is 13 pounds.    Diet Recall Review with Patient 2020   Do you typically eat breakfast? Yes   If you do eat breakfast, what types of food do you eat? eggs, yogurt, fruit, smoothie, avocado toast on simba bread, grits, oatmeal   Do you typically eat lunch? Yes   If you do eat lunch, what types of food do you typically eat?  4-5 oz of protein, hot veggies, salad, and occassional grain 1/4 cup of brown rice or sweet potatoes   Do you typically eat supper? Yes   If you do eat supper, what types of food do " you typically eat? 4-5 oz of protein, hot veggies, salad,  occassionly 1/4 cup of grain   Do you typically eat snacks? Yes   If you do snack, what types of food do you typically eat? 2 oz of protein, 1 cup of veggies , 1 tablespoon of almond butter fruit   Do you like vegetables?  No   Do you drink water? Yes   How many glasses of juice do you drink in a typical day? 0   How many of glasses of milk do you drink in a typical day? 0   If you do drink milk, what type? N/A   How many 8oz glasses of sugar containing drinks such as Alex-Aid/sweet tea do you drink in a day? 0   How many cans/bottles of sugar pop/soda/tea/sports drinks do you drink in a day? 0   How many cans/bottles of diet pop/soda/tea or sports drink do you drink in a day? 0   How often do you have a drink of alcohol? Never       ROS    MEDICATIONS:   Current Outpatient Medications   Medication     lisinopril-hydrochlorothiazide (ZESTORETIC) 20-12.5 MG tablet     topiramate (TOPAMAX) 25 MG tablet     No current facility-administered medications for this visit.        Weight Loss Medication History Reviewed With Patient 9/14/2020   Which weight loss medications are you currently taking on a regular basis?  Topamax (topiramate)   Are you having any side effects from the weight loss medication that we have prescribed you? Yes   If you are having side effects please describe: fatigue       ASSESSMENT:   Obesity.   Good success with weight loss so far    PLAN:   Continue same plan with diet: IF and tracking calories  Continue exercise  Continue topiramate but switch to 50 mg BID.      FOLLOW-UP:    8 weeks.    Time: 25 min spent on evaluation, management, counseling, education, & motivational interviewing with greater than 50 % of the total time was spent on counseling and coordinating care    Sincerely,    Teressa Alejo MD

## 2020-09-15 ENCOUNTER — ALLIED HEALTH/NURSE VISIT (OUTPATIENT)
Dept: NURSING | Facility: CLINIC | Age: 50
End: 2020-09-15
Payer: COMMERCIAL

## 2020-09-15 VITALS — SYSTOLIC BLOOD PRESSURE: 126 MMHG | DIASTOLIC BLOOD PRESSURE: 87 MMHG

## 2020-09-15 DIAGNOSIS — I10 BENIGN ESSENTIAL HYPERTENSION: ICD-10-CM

## 2020-09-15 DIAGNOSIS — Z01.30 BP CHECK: Primary | ICD-10-CM

## 2020-09-15 PROCEDURE — 99207 ZZC NO CHARGE NURSE ONLY: CPT

## 2020-09-15 PROCEDURE — 36415 COLL VENOUS BLD VENIPUNCTURE: CPT | Performed by: INTERNAL MEDICINE

## 2020-09-15 PROCEDURE — 80048 BASIC METABOLIC PNL TOTAL CA: CPT | Performed by: INTERNAL MEDICINE

## 2020-09-16 ENCOUNTER — ALLIED HEALTH/NURSE VISIT (OUTPATIENT)
Dept: PHARMACY | Facility: CLINIC | Age: 50
End: 2020-09-16
Payer: COMMERCIAL

## 2020-09-16 DIAGNOSIS — E66.812 CLASS 2 SEVERE OBESITY DUE TO EXCESS CALORIES WITH SERIOUS COMORBIDITY AND BODY MASS INDEX (BMI) OF 37.0 TO 37.9 IN ADULT (H): ICD-10-CM

## 2020-09-16 DIAGNOSIS — E66.01 CLASS 2 SEVERE OBESITY DUE TO EXCESS CALORIES WITH SERIOUS COMORBIDITY AND BODY MASS INDEX (BMI) OF 37.0 TO 37.9 IN ADULT (H): ICD-10-CM

## 2020-09-16 DIAGNOSIS — I10 BENIGN ESSENTIAL HYPERTENSION: Primary | ICD-10-CM

## 2020-09-16 LAB
ANION GAP SERPL CALCULATED.3IONS-SCNC: 4 MMOL/L (ref 3–14)
BUN SERPL-MCNC: 15 MG/DL (ref 7–30)
CALCIUM SERPL-MCNC: 8.8 MG/DL (ref 8.5–10.1)
CHLORIDE SERPL-SCNC: 106 MMOL/L (ref 94–109)
CO2 SERPL-SCNC: 25 MMOL/L (ref 20–32)
CREAT SERPL-MCNC: 1.09 MG/DL (ref 0.52–1.04)
GFR SERPL CREATININE-BSD FRML MDRD: 59 ML/MIN/{1.73_M2}
GLUCOSE SERPL-MCNC: 83 MG/DL (ref 70–99)
POTASSIUM SERPL-SCNC: 3.4 MMOL/L (ref 3.4–5.3)
SODIUM SERPL-SCNC: 135 MMOL/L (ref 133–144)

## 2020-09-16 PROCEDURE — 99607 MTMS BY PHARM ADDL 15 MIN: CPT | Performed by: PHARMACIST

## 2020-09-16 PROCEDURE — 99606 MTMS BY PHARM EST 15 MIN: CPT | Performed by: PHARMACIST

## 2020-09-16 NOTE — PROGRESS NOTES
MTM ENCOUNTER  SUBJECTIVE/OBJECTIVE:                           Melyssa Kirk is a 50 year old female called for follow-up visit. She was referred to me from Dr. Alejo.    Patient consented to a telehealth visit: yes  Telemedicine Visit Details  Type of service:  Telephone visit  Start Time: 4:05 PM  End Time: 4:30 PM  Originating Location (pt. Location): Home  Distant Location (provider location):  [facility] MTM  Mode of Communication:  Telephone    Chief Complaint: topiramate dose change check in     Allergies/ADRs: Reviewed in EHR  Tobacco:  reports that she has never smoked. She has never used smokeless tobacco.  Alcohol: not currently using  Caffeine: no caffeine  Activity: see below   PMH: Reviewed in EHR    Medication Adherence/Access: no issues reported    Obesity:   Topiramate 50 mg BID     Followed by Dr. Teressa Alejo, seen 9/14/2020 Return Medical Weight Management, topiramate dose increased at that time. Patient is experiencing the follow side effects: fatigue?. No more tingling in hands/feet. She reports she increased topiramate today. She is noticing taste disturbances with carbonated beverages (Kamboocha). Attributes that the fatigue could be from various factors. Feels that the status of where her weight is now is important moment as its previously is where she falls off her habits, and she would like to continue with her nutritious habits and keep to weight loss.   Weight History: See Dr. Alejo's note from 6/29 for weight history in detail. She did have greater weight gain when moving from from [place] to [place]. Over the last year, she has been a part of [employer], has been working with nutritionist, had been losing weight 25-30 lb on a food plan but then weight stalled.   Diet/Eating Habits: Patient reports: meal prepping, eating more nutritious foods. Reports not doing intermittent fasting as strictly as she was a couple weeks ago so recommitted a week ago. She  "feels her water intake is \"okay\", she reports that over the weekend she was out of town and didn't drink very much water daily. She restarted back to her normal 1 gallon per day today.    Exercise/Activity: Patient reports running/walk 30 minutes in AM 3 times per week. Then do yoga 1-2 times per week. Recreationally she will bike every so often. She has been an \"over exerciser\" before, so trying to exercise more in a healthy way.      Current weight today: 214 lb   Initial Consult Weight 6/29/2020: 227 lb  Cumulative Weight Loss: -13 lb   Wt Readings from Last 4 Encounters:   09/14/20 214 lb 9.6 oz (97.3 kg)   06/29/20 227 lb (103 kg)   05/19/15 233 lb (105.7 kg)     Estimated body mass index is 35.16 kg/m  as calculated from the following:    Height as of 9/14/20: 5' 5.51\" (1.664 m).    Weight as of 9/14/20: 214 lb 9.6 oz (97.3 kg).    Hypertension:   Lisinopril-hydrochlorothiazide 20-12.5 mg daily in AM.      Patient does not self-monitor BP, but did get her blood pressure checked yesterday, in chart. Does not have blood pressure monitor at home. Patient reports no current medication side effects. No reports of lightheadedness/dizziness.     BP Readings from Last 3 Encounters:   09/15/20 126/87   05/19/15 124/82     Potassium   Date Value Ref Range Status   09/15/2020 3.4 3.4 - 5.3 mmol/L Final     Creatinine   Date Value Ref Range Status   09/15/2020 1.09 (H) 0.52 - 1.04 mg/dL Final     GFR Estimate   Date Value Ref Range Status   09/15/2020 59 (L) >60 mL/min/[1.73_m2] Final     Comment:     Non  GFR Calc  Starting 12/18/2018, serum creatinine based estimated GFR (eGFR) will be   calculated using the Chronic Kidney Disease Epidemiology Collaboration   (CKD-EPI) equation.       GFR Estimate If Black   Date Value Ref Range Status   09/15/2020 68 >60 mL/min/[1.73_m2] Final     Comment:      GFR Calc  Starting 12/18/2018, serum creatinine based estimated GFR (eGFR) will be "   calculated using the Chronic Kidney Disease Epidemiology Collaboration   (CKD-EPI) equation.       ASSESSMENT:                            Medication Adherence: good, no issues identified    Obesity: Progressing, no changes at this time. Would benefit from repeat BMP in 1 month to recheck kidney function. Believe that from patient discussion slight creatinine elevation may be due to dehydration/less water intake.     Hypertension: Stable. At goal <140/90 mmHg.     PLAN:                            1. Repeat BMP 1 month.     I spent 25 minutes with this patient today. A copy of the visit note was provided to the patient's referring provider.    Will follow up in 1 month, scheduled.     The patient declined a summary of these recommendations.     Lauren Bloch, PharmD  Medication Therapy Management Pharmacist   [facility]   Phone: [phone]

## 2020-09-16 NOTE — Clinical Note
Answered her questions. She is doing well otherwise. She did mention being more dehydrated that weekend prior to lab so she will get rechecked in 1 month and will ensure she has adequate hydration when rechecking. THnaks! Re SANTACRUZ

## 2020-10-23 ENCOUNTER — ALLIED HEALTH/NURSE VISIT (OUTPATIENT)
Dept: NURSING | Facility: CLINIC | Age: 50
End: 2020-10-23
Payer: COMMERCIAL

## 2020-10-23 VITALS — SYSTOLIC BLOOD PRESSURE: 134 MMHG | HEART RATE: 65 BPM | DIASTOLIC BLOOD PRESSURE: 98 MMHG | OXYGEN SATURATION: 99 %

## 2020-10-23 DIAGNOSIS — E66.01 CLASS 2 SEVERE OBESITY DUE TO EXCESS CALORIES WITH SERIOUS COMORBIDITY AND BODY MASS INDEX (BMI) OF 37.0 TO 37.9 IN ADULT (H): ICD-10-CM

## 2020-10-23 DIAGNOSIS — Z01.30 BP CHECK: Primary | ICD-10-CM

## 2020-10-23 DIAGNOSIS — I10 BENIGN ESSENTIAL HYPERTENSION: ICD-10-CM

## 2020-10-23 DIAGNOSIS — E66.812 CLASS 2 SEVERE OBESITY DUE TO EXCESS CALORIES WITH SERIOUS COMORBIDITY AND BODY MASS INDEX (BMI) OF 37.0 TO 37.9 IN ADULT (H): ICD-10-CM

## 2020-10-23 LAB
ANION GAP SERPL CALCULATED.3IONS-SCNC: 3 MMOL/L (ref 3–14)
BUN SERPL-MCNC: 12 MG/DL (ref 7–30)
CALCIUM SERPL-MCNC: 9.2 MG/DL (ref 8.5–10.1)
CHLORIDE SERPL-SCNC: 110 MMOL/L (ref 94–109)
CO2 SERPL-SCNC: 27 MMOL/L (ref 20–32)
CREAT SERPL-MCNC: 0.74 MG/DL (ref 0.52–1.04)
GFR SERPL CREATININE-BSD FRML MDRD: >90 ML/MIN/{1.73_M2}
GLUCOSE SERPL-MCNC: 85 MG/DL (ref 70–99)
POTASSIUM SERPL-SCNC: 3.6 MMOL/L (ref 3.4–5.3)
SODIUM SERPL-SCNC: 140 MMOL/L (ref 133–144)

## 2020-10-23 PROCEDURE — 80048 BASIC METABOLIC PNL TOTAL CA: CPT | Performed by: INTERNAL MEDICINE

## 2020-10-23 PROCEDURE — 99207 PR NO CHARGE NURSE ONLY: CPT

## 2020-10-23 PROCEDURE — 36415 COLL VENOUS BLD VENIPUNCTURE: CPT | Performed by: INTERNAL MEDICINE

## 2020-10-23 NOTE — NURSING NOTE
Melyssa Kirk is a 50 year old patient who comes in today for a Blood Pressure check.  Initial BP:  BP (!) 134/98 (BP Location: Right arm, Patient Position: Sitting, Cuff Size: Adult Large)   Pulse 65   SpO2 99%      65  Disposition: Told patient to follow up with provider.    Lian Hughes MA

## 2020-12-31 DIAGNOSIS — E66.812 CLASS 2 SEVERE OBESITY WITH SERIOUS COMORBIDITY AND BODY MASS INDEX (BMI) OF 37.0 TO 37.9 IN ADULT, UNSPECIFIED OBESITY TYPE (H): ICD-10-CM

## 2020-12-31 DIAGNOSIS — E66.01 CLASS 2 SEVERE OBESITY WITH SERIOUS COMORBIDITY AND BODY MASS INDEX (BMI) OF 37.0 TO 37.9 IN ADULT, UNSPECIFIED OBESITY TYPE (H): ICD-10-CM

## 2020-12-31 RX ORDER — TOPIRAMATE 25 MG/1
50 TABLET, FILM COATED ORAL 2 TIMES DAILY
Qty: 120 TABLET | Refills: 0 | Status: SHIPPED | OUTPATIENT
Start: 2020-12-31 | End: 2021-01-25

## 2020-12-31 NOTE — TELEPHONE ENCOUNTER
Refill request from pharmacy for Topiramate. Appointment with Dr. Alejo on 1/25/21. Sending refill to pharmacy.

## 2021-01-07 ENCOUNTER — VIRTUAL VISIT (OUTPATIENT)
Dept: PHARMACY | Facility: CLINIC | Age: 51
End: 2021-01-07
Payer: COMMERCIAL

## 2021-01-07 DIAGNOSIS — I10 BENIGN ESSENTIAL HYPERTENSION: ICD-10-CM

## 2021-01-07 DIAGNOSIS — E66.812 CLASS 2 SEVERE OBESITY DUE TO EXCESS CALORIES WITH SERIOUS COMORBIDITY AND BODY MASS INDEX (BMI) OF 37.0 TO 37.9 IN ADULT (H): Primary | ICD-10-CM

## 2021-01-07 DIAGNOSIS — E66.01 CLASS 2 SEVERE OBESITY DUE TO EXCESS CALORIES WITH SERIOUS COMORBIDITY AND BODY MASS INDEX (BMI) OF 37.0 TO 37.9 IN ADULT (H): Primary | ICD-10-CM

## 2021-01-07 PROCEDURE — 99605 MTMS BY PHARM NP 15 MIN: CPT | Mod: TEL | Performed by: PHARMACIST

## 2021-01-07 PROCEDURE — 99607 MTMS BY PHARM ADDL 15 MIN: CPT | Mod: TEL | Performed by: PHARMACIST

## 2021-01-07 NOTE — PROGRESS NOTES
"Medication Therapy Management (MTM) Encounter    ASSESSMENT/PLAN:                            Medication Adherence/Access: No issues identified    Obsity: Progressing, patient has lost ~10% of body weight thus far, as patient is getting back to before holiday nutrition changes/activity, we decided to hold off on changes/additional medications today.     Hypertension: Lat BP not at goal <140/90 mmHg. Discussed getting BP repeat in near future for check in.     PLAN:   1. Continue current regimen for now.   2. Patient encouraged to get BP rechecked as last out of goal.   3. Patient to continue to get back to pre-holiday behavior/nutrition/activity changes.     FOLLOW-UP:  1 month with Dr. Alejo, Follow up with MTM PRN.     SUBJECTIVE/OBJECTIVE:                           Melyssa Kirk is a 50 year old female called for a follow-up visit. She was referred to me from Dr. Alejo.  Today's visit is a follow-up MTM visit from 9/16/2020. First visit of 2021.      Reason for visit: checking in with the new year on her plan for 2021 with healthy lifestyle.    Allergies/ADRs: Reviewed in chart  Tobacco: She reports that she has never smoked. She has never used smokeless tobacco.  Alcohol: not currently using  Caffeine: no caffeine  Activity: see below  Past Medical History: Reviewed in chart    Medication Adherence/Access: no issues reported    Obesity:   Topiramate 50 mg BID      Followed by Dr. Teressa Alejo, seen 9/14/2020 Return Medical Weight Management. She feels like the last couple months are \"okay\". She does find that there has been more stress lately. She did denote she has indulged more during the holidays. She has been able to maintain weight and happy about this through the holidays. She reports 1/1/2021 she has gotten back on her previous nutrition plan and has added back in activity.   Weight History: See Dr. Alejo's note from 6/29 for weight history in detail. She did have greater weight gain when moving from " "from [place] to [place]. Over the last year, she has been a part of [employer], has been working with nutritionist, had been losing weight 25-30 lb on a food plan but then weight stalled.   Diet/Eating Habits: Intermittent fasting 16-8 rule, 11 am-7 pm is her eating window. Eating 2 meals + snack each day. Snack: fruit + yogurt or nut butter. Meals are protein filled + salad or vegetables. She will have 1 grain at lunch and sometimes dinner. November and December was having more carbohydrates. She does reflect on her eating/snacking to determine how to modify her eating patterns for the next month. She feels her eating habits will be going back to her new \"normal\" in January.   Exercise/Activity: Patient reports that she has re-set her goals regarding her exercise.      Current weight today: 204.4 lb   Initial Consult Weight 6/29/2020: 227 lb  Cumulative Weight Loss: -22.6 lb     Wt Readings from Last 4 Encounters:   09/14/20 214 lb 9.6 oz (97.3 kg)   06/29/20 227 lb (103 kg)   05/19/15 233 lb (105.7 kg)     Hypertension:   Lisinopril-hydrochlorothiazide 20-12.5 mg daily in AM.      Patient does not self-monitor BP. Does not have blood pressure monitor at home. Patient reports no current medication side effects. No reports of lightheadedness/dizziness.     BP Readings from Last 3 Encounters:   10/23/20 (!) 134/98   09/15/20 126/87   05/19/15 124/82     Creatinine   Date Value Ref Range Status   10/23/2020 0.74 0.52 - 1.04 mg/dL Final     GFR Estimate   Date Value Ref Range Status   10/23/2020 >90 >60 mL/min/[1.73_m2] Final     Comment:     Non  GFR Calc  Starting 12/18/2018, serum creatinine based estimated GFR (eGFR) will be   calculated using the Chronic Kidney Disease Epidemiology Collaboration   (CKD-EPI) equation.       GFR Estimate If Black   Date Value Ref Range Status   10/23/2020 >90 >60 mL/min/[1.73_m2] Final     Comment:      GFR Calc  Starting 12/18/2018, " serum creatinine based estimated GFR (eGFR) will be   calculated using the Chronic Kidney Disease Epidemiology Collaboration   (CKD-EPI) equation.       Potassium   Date Value Ref Range Status   10/23/2020 3.6 3.4 - 5.3 mmol/L Final       I spent 20 minutes with this patient today. A copy of the visit note was provided to the patient's referring provider.    The patient declined a summary of these recommendations.     Lauren Bloch, PharmD  Medication Therapy Management Pharmacist   [facility]   Phone: [phone]    Telemedicine Visit Details  Type of service:  Telephone visit  Start Time: 3:30 PM  End Time: 3:50 PM  Originating Location (patient location): Home  Distant Location (provider location):  [facility]      Medication Therapy Recommendations  Benign essential hypertension    Current Medication: lisinopril-hydrochlorothiazide (ZESTORETIC) 20-12.5 MG tablet   Rationale: Medication requires monitoring - Needs additional monitoring   Recommendation: Self-Monitoring - Get BP rechecked as last in Oct 2020 was out of goal.   Status: Patient Agreed - Adherence/Education

## 2021-01-07 NOTE — Clinical Note
Parris, she sees you in 1 month. Patient has thus far lost 10% of body weight on topiramate. Only other thing we discussed was her last BP was out of goala nd to get rechecked. We discussed dose changes/adding meds but we agreed that she has a good plan in place to get back on track so may not b necessary as of now. Thanks! Re SANTACRUZ

## 2021-01-15 ENCOUNTER — HEALTH MAINTENANCE LETTER (OUTPATIENT)
Age: 51
End: 2021-01-15

## 2021-01-24 ENCOUNTER — HEALTH MAINTENANCE LETTER (OUTPATIENT)
Age: 51
End: 2021-01-24

## 2021-01-25 ENCOUNTER — VIRTUAL VISIT (OUTPATIENT)
Dept: ENDOCRINOLOGY | Facility: CLINIC | Age: 51
End: 2021-01-25
Payer: COMMERCIAL

## 2021-01-25 VITALS — WEIGHT: 204.2 LBS | BODY MASS INDEX: 32.82 KG/M2 | HEIGHT: 66 IN

## 2021-01-25 DIAGNOSIS — E66.812 CLASS 2 SEVERE OBESITY WITH SERIOUS COMORBIDITY AND BODY MASS INDEX (BMI) OF 37.0 TO 37.9 IN ADULT, UNSPECIFIED OBESITY TYPE (H): Primary | ICD-10-CM

## 2021-01-25 DIAGNOSIS — E66.01 CLASS 2 SEVERE OBESITY WITH SERIOUS COMORBIDITY AND BODY MASS INDEX (BMI) OF 37.0 TO 37.9 IN ADULT, UNSPECIFIED OBESITY TYPE (H): Primary | ICD-10-CM

## 2021-01-25 PROCEDURE — 99214 OFFICE O/P EST MOD 30 MIN: CPT | Mod: GT | Performed by: INTERNAL MEDICINE

## 2021-01-25 RX ORDER — TOPIRAMATE 100 MG/1
100 TABLET, FILM COATED ORAL DAILY
Qty: 90 TABLET | Refills: 3 | Status: SHIPPED | OUTPATIENT
Start: 2021-01-25 | End: 2022-08-20

## 2021-01-25 ASSESSMENT — MIFFLIN-ST. JEOR: SCORE: 1558

## 2021-01-25 ASSESSMENT — PAIN SCALES - GENERAL: PAINLEVEL: NO PAIN (0)

## 2021-01-25 NOTE — PROGRESS NOTES
Melyssa is a 50 year old who is being evaluated via a billable video visit.      How would you like to obtain your AVS? MyChart  If the video visit is dropped, the invitation should be resent by: Text to cell phone: [phone]  Will anyone else be joining your video visit? No      During this virtual visit the patient is located in [place], patient verifies this as the location during the entirety of this visit.     Video Start Time: 9:40  Video-Visit Details    Type of service:  Video Visit    Video End Time:10:00    Originating Location (pt. Location): Home    Distant Location (provider location):  [facility] WE[facility] MANAGEMENT [facility]IN[facility] [place]     Platform used for Video Visit: DCWafers

## 2021-01-25 NOTE — LETTER
"1/25/2021       RE: Melyssa Kirk  [address] [place] [address]     Dear Colleague,    Thank you for referring your patient, Melyssa Kirk, to the [facility]NAG[facility]INIC [place] at [facility]. Please see a copy of my visit note below.    Assessment & Plan     Class 2 severe obesity with serious comorbidity and body mass index (BMI) of 37.0 to 37.9 in adult, unspecified obesity type (H)  Good success with weight loss so far.    PLAN:   Continue same plan with diet: IF and tracking calories  Continue exercise  Continue topiramate but switch to 100 mg daily.   Consider adding phentermine is plateau      30 minutes spent on the date of the encounter doing chart review, history and exam, documentation and further activities as noted above         BMI:   Estimated body mass index is 32.96 kg/m  as calculated from the following:    Height as of this encounter: 1.676 m (5' 6\").    Weight as of this encounter: 92.6 kg (204 lb 3.2 oz).   Weight management plan: this is her weight management visit      No follow-ups on file.    Teressa Alejo MD  [facility]NA[facility] [place]    Subjective     Melyssa Kirk is a 50 year old female who presents today for the following   health issues: obesity.     Initial visit with me on 6/29/2020. Weight then was 227 lbs.   At that point, I started her on topiramate.     Since last visit, her diet was not as strict over the holidays. Was snacking more due to stress. Then she stopped them.     Diet:   Intermittent fasting. Eats between 12-8 most days of the week.   Food prepping.   Usually 2 meals and a snack.  Logging her food intake: using BemDire[employer] pal. She is mostly ~ 9529-8181 Kcal.     Exercise: 3 days/week. Runs and jogs. Some yoga.     Meds:   Topiramate 50 mg BID.    She finds that it has decreased her appetite.   No side effects.     She has HTN. Well controlled on lisinipril/HCTZ. " "    Weight tracking:  Initial consult weight was 227 on 6/29/2020.  Weight change since last seen on 9/14//2020 is down 10 pounds.   Total loss is 23 pounds.    Review of Systems  Constitutional, HEENT, cardiovascular, pulmonary, gi and gu systems are negative, except as otherwise noted.    Objective   Ht 1.676 m (5' 6\")   Wt 92.6 kg (204 lb 3.2 oz)   BMI 32.96 kg/m    Body mass index is 32.96 kg/m .    Physical Exam  GENERAL: Healthy, alert and no distress  EYES: Eyes grossly normal to inspection.  No discharge or erythema, or obvious scleral/conjunctival abnormalities.  RESP: No audible wheeze, cough, or visible cyanosis.  No visible retractions or increased work of breathing.    SKIN: Visible skin clear. No significant rash, abnormal pigmentation or lesions.  NEURO: Cranial nerves grossly intact.  Mentation and speech appropriate for age.  PSYCH: Mentation appears normal, affect normal/bright, judgement and insight intact, normal speech and appearance well-groomed.         Melyssa is a 50 year old who is being evaluated via a billable video visit.      How would you like to obtain your AVS? MyChart  If the video visit is dropped, the invitation should be resent by: Text to cell phone: [phone]  Will anyone else be joining your video visit? No      During this virtual visit the patient is located in [place], patient verifies this as the location during the entirety of this visit.     Video Start Time: 9:40  Video-Visit Details    Type of service:  Video Visit    Video End Time:10:00    Originating Location (pt. Location): Home    Distant Location (provider location):  [facility]NAG[facility] [place]     Platform used for Video Visit: Gustavo"

## 2021-01-25 NOTE — NURSING NOTE
"Chief Complaint   Patient presents with     Follow Up     [facility] follow up       Vitals:    01/25/21 0910   Weight: 92.6 kg (204 lb 3.2 oz)   Height: 1.676 m (5' 6\")       Body mass index is 32.96 kg/m .                            "

## 2021-03-30 ENCOUNTER — IMMUNIZATION (OUTPATIENT)
Dept: NURSING | Facility: CLINIC | Age: 51
End: 2021-03-30
Payer: COMMERCIAL

## 2021-03-30 PROCEDURE — 0001A PR COVID VAC PFIZER DIL RECON 30 MCG/0.3 ML IM: CPT

## 2021-03-30 PROCEDURE — 91300 PR COVID VAC PFIZER DIL RECON 30 MCG/0.3 ML IM: CPT

## 2021-04-20 ENCOUNTER — IMMUNIZATION (OUTPATIENT)
Dept: NURSING | Facility: CLINIC | Age: 51
End: 2021-04-20
Attending: INTERNAL MEDICINE
Payer: COMMERCIAL

## 2021-04-20 PROCEDURE — 91300 PR COVID VAC PFIZER DIL RECON 30 MCG/0.3 ML IM: CPT

## 2021-04-20 PROCEDURE — 0002A PR COVID VAC PFIZER DIL RECON 30 MCG/0.3 ML IM: CPT

## 2021-09-04 ENCOUNTER — HEALTH MAINTENANCE LETTER (OUTPATIENT)
Age: 51
End: 2021-09-04

## 2021-12-23 DIAGNOSIS — E66.01 CLASS 2 SEVERE OBESITY WITH SERIOUS COMORBIDITY AND BODY MASS INDEX (BMI) OF 37.0 TO 37.9 IN ADULT, UNSPECIFIED OBESITY TYPE (H): ICD-10-CM

## 2021-12-23 DIAGNOSIS — E66.812 CLASS 2 SEVERE OBESITY WITH SERIOUS COMORBIDITY AND BODY MASS INDEX (BMI) OF 37.0 TO 37.9 IN ADULT, UNSPECIFIED OBESITY TYPE (H): ICD-10-CM

## 2021-12-28 NOTE — TELEPHONE ENCOUNTER
topiramate (TOPAMAX) 100 MG tablet  Last Written Prescription Date:  1/25/2021  Last Fill Quantity: 90,   # refills: 3  Last Office Visit : 1/25/2021  Future Office visit:  None    Routing refill request to provider for review/approval because:  Last Seen by Dr Alejo who is gone  Refer to clinic for review       Laly Mayes RN  Central Triage Red Flags/Med Refills

## 2021-12-29 NOTE — TELEPHONE ENCOUNTER
LVM and sent mychart. Pt needs appt for medication refill. Per Clinic, patient can schedule virtual appt next available with any provider (since original provider Dr. Alejo is no longer at clinic).

## 2022-01-27 RX ORDER — TOPIRAMATE 100 MG/1
100 TABLET, FILM COATED ORAL DAILY
Qty: 90 TABLET | OUTPATIENT
Start: 2022-01-27

## 2022-02-19 ENCOUNTER — HEALTH MAINTENANCE LETTER (OUTPATIENT)
Age: 52
End: 2022-02-19

## 2022-03-21 ENCOUNTER — VIRTUAL VISIT (OUTPATIENT)
Dept: ENDOCRINOLOGY | Facility: CLINIC | Age: 52
End: 2022-03-21
Payer: COMMERCIAL

## 2022-03-21 VITALS — HEIGHT: 65 IN | BODY MASS INDEX: 34.16 KG/M2 | WEIGHT: 205 LBS

## 2022-03-21 DIAGNOSIS — E66.01 CLASS 2 SEVERE OBESITY WITH SERIOUS COMORBIDITY AND BODY MASS INDEX (BMI) OF 37.0 TO 37.9 IN ADULT, UNSPECIFIED OBESITY TYPE (H): Primary | ICD-10-CM

## 2022-03-21 DIAGNOSIS — E66.812 CLASS 2 SEVERE OBESITY WITH SERIOUS COMORBIDITY AND BODY MASS INDEX (BMI) OF 37.0 TO 37.9 IN ADULT, UNSPECIFIED OBESITY TYPE (H): Primary | ICD-10-CM

## 2022-03-21 PROCEDURE — 99203 OFFICE O/P NEW LOW 30 MIN: CPT | Mod: GT | Performed by: PHYSICIAN ASSISTANT

## 2022-03-21 RX ORDER — TOPIRAMATE 25 MG/1
TABLET, FILM COATED ORAL
Qty: 42 TABLET | Refills: 0 | Status: SHIPPED | OUTPATIENT
Start: 2022-03-21 | End: 2022-08-20 | Stop reason: ALTCHOICE

## 2022-03-21 ASSESSMENT — PAIN SCALES - GENERAL: PAINLEVEL: NO PAIN (0)

## 2022-03-21 NOTE — LETTER
3/21/2022       RE: Melyssa Kirk  [address] [place] [address]     Dear Colleague,    Thank you for referring your patient, Melyssa Kirk, to the [facility] [place] at [facility]. Please see a copy of my visit note below.    Melyssa is a 51 year old who is being evaluated via a billable video visit.      How would you like to obtain your AVS? MyChart  If the video visit is dropped, the invitation should be resent by: Text to cell phone: cell  Will anyone else be joining your video visit? No    Video Start Time: 810     Video-Visit Details    Type of service:  Video Visit    Video End Time:820    Originating Location (pt. Location): Home    Distant Location (provider location):  [facility] [place]     Platform used for Video Visit: [employer]      15 minutes spent on the date of the encounter doing chart review, history and exam, documentation and further activities per the note    Return Medical Weight Management Note     Melyssa Kirk  MRN:  [id number]  :  1970  MILEY:  3/21/2022    Dear Laine Pradhan,    I had the pleasure of seeing your patient Melyssa Kirk. She is a 51 year old female who I am continuing to see for treatment of obesity related to:       2020   I have the following health issues associated with obesity: High Blood Pressure   I have the following symptoms associated with obesity: Depression, Fatigue       Assessment & Plan   Problem List Items Addressed This Visit        Endocrine Diagnoses    Class 2 severe obesity with serious comorbidity and body mass index (BMI) of 37.0 to 37.9 in adult, unspecified obesity type (H) - Primary    Relevant Medications    topiramate (TOPAMAX) 25 MG tablet           INTERVAL HISTORY:  [facility] follow up.Worked with Dr Alejo in the past.   Weight stable  Has lost from 227 to 205 and weight stable the last year.  Working  with RD and exercising regularly  Taking topiramate 100mg but would like to taper off.  No side effects but she would like to stop taking it    PLAN:  Taper instructions for topiramate given to patient  Pranav with any questions.    Follow up prn      CURRENT WEIGHT:   205 lbs 0 oz    Initial Weight (lbs): 227 lbs  Last Visits Weight: 97.3 kg (214 lb 9.6 oz)  Cumulative weight loss (lbs): 22  Weight Loss Percentage: 9.69%    Changes and Difficulties 3/18/2022   I have made the following changes to my diet since my last visit: I am no longer doing intermittent fasting   With regards to my diet, I am still struggling with: -   I have made the following changes to my activity/exercise since my last visit: I still exercise consistently   With regards to my activity/exercise, I am still struggling with: -         MEDICATIONS:   Current Outpatient Medications   Medication Sig Dispense Refill     lisinopril-hydrochlorothiazide (ZESTORETIC) 20-12.5 MG tablet Take 1 tablet by mouth daily        topiramate (TOPAMAX) 100 MG tablet Take 1 tablet (100 mg) by mouth daily 90 tablet 3     topiramate (TOPAMAX) 25 MG tablet Take 75mg (3 tabs) x 1 week, then take 50mg (2 tabs) x 1 week, then take 25mg (1 tab) x 1 week then ok to stop 42 tablet 0       Weight Loss Medication History Reviewed With Patient 3/18/2022   Which weight loss medications are you currently taking on a regular basis?  Topamax (topiramate)   Are you having any side effects from the weight loss medication that we have prescribed you? No   If you are having side effects please describe: I am not having so de effects but I do not wish to continue taking the medication       Orders Only on 10/23/2020   Component Date Value Ref Range Status     Sodium 10/23/2020 140  133 - 144 mmol/L Final     Potassium 10/23/2020 3.6  3.4 - 5.3 mmol/L Final     Chloride 10/23/2020 110 (A) 94 - 109 mmol/L Final     Carbon Dioxide 10/23/2020 27  20 - 32 mmol/L Final     Anion Gap  "10/23/2020 3  3 - 14 mmol/L Final     Glucose 10/23/2020 85  70 - 99 mg/dL Final     Urea Nitrogen 10/23/2020 12  7 - 30 mg/dL Final     Creatinine 10/23/2020 0.74  0.52 - 1.04 mg/dL Final     GFR Estimate 10/23/2020 >90  >60 mL/min/[1.73_m2] Final    Comment: Non  GFR Calc  Starting 12/18/2018, serum creatinine based estimated GFR (eGFR) will be   calculated using the Chronic Kidney Disease Epidemiology Collaboration   (CKD-EPI) equation.       GFR Estimate If Black 10/23/2020 >90  >60 mL/min/[1.73_m2] Final    Comment:  GFR Calc  Starting 12/18/2018, serum creatinine based estimated GFR (eGFR) will be   calculated using the Chronic Kidney Disease Epidemiology Collaboration   (CKD-EPI) equation.       Calcium 10/23/2020 9.2  8.5 - 10.1 mg/dL Final       PHYSICAL EXAM:  Objective    Ht 1.651 m (5' 5\")   Wt 93 kg (205 lb)   BMI 34.11 kg/m      Vitals - Patient Reported  Pain Score: No Pain (0)        GENERAL: Healthy, alert and no distress  EYES: Eyes grossly normal to inspection.  No discharge or erythema, or obvious scleral/conjunctival abnormalities.  RESP: No audible wheeze, cough, or visible cyanosis.  No visible retractions or increased work of breathing.    SKIN: Visible skin clear. No significant rash, abnormal pigmentation or lesions.  NEURO: Cranial nerves grossly intact.  Mentation and speech appropriate for age.  PSYCH: Mentation appears normal, affect normal/bright, judgement and insight intact, normal speech and appearance well-groomed.        Sincerely,    Lakisha Veloz PA-C    "

## 2022-03-21 NOTE — PROGRESS NOTES
Melyssa is a 51 year old who is being evaluated via a billable video visit.      How would you like to obtain your AVS? MyChart  If the video visit is dropped, the invitation should be resent by: Text to cell phone: cell  Will anyone else be joining your video visit? No    Video Start Time: 810     Video-Visit Details    Type of service:  Video Visit    Video End Time:820    Originating Location (pt. Location): Home    Distant Location (provider location):  [facility] [place]     Platform used for Video Visit: [employer]      15 minutes spent on the date of the encounter doing chart review, history and exam, documentation and further activities per the note    Return Medical Weight Management Note     Melyssa Kirk  MRN:  [id number]  :  1970  MILEY:  3/21/2022    Dear Laine Pradhan,    I had the pleasure of seeing your patient Melyssa Kirk. She is a 51 year old female who I am continuing to see for treatment of obesity related to:       2020   I have the following health issues associated with obesity: High Blood Pressure   I have the following symptoms associated with obesity: Depression, Fatigue       Assessment & Plan   Problem List Items Addressed This Visit        Endocrine Diagnoses    Class 2 severe obesity with serious comorbidity and body mass index (BMI) of 37.0 to 37.9 in adult, unspecified obesity type (H) - Primary    Relevant Medications    topiramate (TOPAMAX) 25 MG tablet           INTERVAL HISTORY:  [facility] follow up.Worked with Dr Alejo in the past.   Weight stable  Has lost from 227 to 205 and weight stable the last year.  Working with RD and exercising regularly  Taking topiramate 100mg but would like to taper off.  No side effects but she would like to stop taking it    PLAN:  Taper instructions for topiramate given to patient  Pranav with any questions.    Follow up prn      CURRENT WEIGHT:   205 lbs 0 oz    Initial Weight (lbs): 227 lbs  Last  Visits Weight: 97.3 kg (214 lb 9.6 oz)  Cumulative weight loss (lbs): 22  Weight Loss Percentage: 9.69%    Changes and Difficulties 3/18/2022   I have made the following changes to my diet since my last visit: I am no longer doing intermittent fasting   With regards to my diet, I am still struggling with: -   I have made the following changes to my activity/exercise since my last visit: I still exercise consistently   With regards to my activity/exercise, I am still struggling with: -         MEDICATIONS:   Current Outpatient Medications   Medication Sig Dispense Refill     lisinopril-hydrochlorothiazide (ZESTORETIC) 20-12.5 MG tablet Take 1 tablet by mouth daily        topiramate (TOPAMAX) 100 MG tablet Take 1 tablet (100 mg) by mouth daily 90 tablet 3     topiramate (TOPAMAX) 25 MG tablet Take 75mg (3 tabs) x 1 week, then take 50mg (2 tabs) x 1 week, then take 25mg (1 tab) x 1 week then ok to stop 42 tablet 0       Weight Loss Medication History Reviewed With Patient 3/18/2022   Which weight loss medications are you currently taking on a regular basis?  Topamax (topiramate)   Are you having any side effects from the weight loss medication that we have prescribed you? No   If you are having side effects please describe: I am not having so de effects but I do not wish to continue taking the medication       Orders Only on 10/23/2020   Component Date Value Ref Range Status     Sodium 10/23/2020 140  133 - 144 mmol/L Final     Potassium 10/23/2020 3.6  3.4 - 5.3 mmol/L Final     Chloride 10/23/2020 110 (A) 94 - 109 mmol/L Final     Carbon Dioxide 10/23/2020 27  20 - 32 mmol/L Final     Anion Gap 10/23/2020 3  3 - 14 mmol/L Final     Glucose 10/23/2020 85  70 - 99 mg/dL Final     Urea Nitrogen 10/23/2020 12  7 - 30 mg/dL Final     Creatinine 10/23/2020 0.74  0.52 - 1.04 mg/dL Final     GFR Estimate 10/23/2020 >90  >60 mL/min/[1.73_m2] Final    Comment: Non  GFR Calc  Starting 12/18/2018, serum  "creatinine based estimated GFR (eGFR) will be   calculated using the Chronic Kidney Disease Epidemiology Collaboration   (CKD-EPI) equation.       GFR Estimate If Black 10/23/2020 >90  >60 mL/min/[1.73_m2] Final    Comment:  GFR Calc  Starting 12/18/2018, serum creatinine based estimated GFR (eGFR) will be   calculated using the Chronic Kidney Disease Epidemiology Collaboration   (CKD-EPI) equation.       Calcium 10/23/2020 9.2  8.5 - 10.1 mg/dL Final       PHYSICAL EXAM:  Objective    Ht 1.651 m (5' 5\")   Wt 93 kg (205 lb)   BMI 34.11 kg/m      Vitals - Patient Reported  Pain Score: No Pain (0)        GENERAL: Healthy, alert and no distress  EYES: Eyes grossly normal to inspection.  No discharge or erythema, or obvious scleral/conjunctival abnormalities.  RESP: No audible wheeze, cough, or visible cyanosis.  No visible retractions or increased work of breathing.    SKIN: Visible skin clear. No significant rash, abnormal pigmentation or lesions.  NEURO: Cranial nerves grossly intact.  Mentation and speech appropriate for age.  PSYCH: Mentation appears normal, affect normal/bright, judgement and insight intact, normal speech and appearance well-groomed.        Sincerely,    Lakisha Veloz PA-C  "

## 2022-03-21 NOTE — NURSING NOTE
"Chief Complaint   Patient presents with     RECHECK     Follow-up Weight Management       Vitals:    03/21/22 0735   Weight: 93 kg (205 lb)   Height: 1.651 m (5' 5\")       Body mass index is 34.11 kg/m .                         "

## 2022-03-24 NOTE — NURSING NOTE
Chief Complaint   Patient presents with     RECHECK     Follow up appointment.       Vitals:    09/14/20 1008   Weight: 97.3 kg (214 lb 9.6 oz)       Body mass index is 35.17 kg/m .                            KUMAR SEPULVEDA EMT    
Chronic, stable   - Continue home atorvastatin 20mg qD

## 2022-08-18 ENCOUNTER — VIRTUAL VISIT (OUTPATIENT)
Dept: PHARMACY | Facility: CLINIC | Age: 52
End: 2022-08-18
Payer: COMMERCIAL

## 2022-08-18 DIAGNOSIS — I10 BENIGN ESSENTIAL HYPERTENSION: Primary | ICD-10-CM

## 2022-08-18 DIAGNOSIS — E66.01 CLASS 2 SEVERE OBESITY WITH SERIOUS COMORBIDITY AND BODY MASS INDEX (BMI) OF 37.0 TO 37.9 IN ADULT, UNSPECIFIED OBESITY TYPE (H): ICD-10-CM

## 2022-08-18 DIAGNOSIS — E66.812 CLASS 2 SEVERE OBESITY WITH SERIOUS COMORBIDITY AND BODY MASS INDEX (BMI) OF 37.0 TO 37.9 IN ADULT, UNSPECIFIED OBESITY TYPE (H): ICD-10-CM

## 2022-08-18 PROCEDURE — 99607 MTMS BY PHARM ADDL 15 MIN: CPT | Performed by: PHARMACIST

## 2022-08-18 PROCEDURE — 99605 MTMS BY PHARM NP 15 MIN: CPT | Performed by: PHARMACIST

## 2022-08-18 NOTE — Clinical Note
VALERIA - you last saw her in March - she scheduled with me as could not get in to see you for months. Restarted topiramate and ordered BMP since she is on lisin/hydrochlorothiazide for blood pressure and hasn't been checked in a little while. She is to follow up with you in 4-6 weeks. Re SANTACRUZ

## 2022-08-18 NOTE — PROGRESS NOTES
Medication Therapy Management (MTM) Encounter    ASSESSMENT:                            Medication Adherence/Access: No issues identified    Obesity: Due to efficacy historically of topiramate, would benefit from restarting topiramate. As already tolerating topiramate to previous dose will continue at this dose for now. Would benefit from BMP after 4 week due to multiple medications within regimen with varying impact on electrolytes (potassium, etc).     Hypertension: Status unknown. Would benefit from blood pressure repeat.     PLAN:                            1. Continue topiramate 100 mg daily - RX sent to pharmacy.     2. Get lab completed at [facility] in 4 weeks, BMP ordered.    3. Get blood pressure checked - can have checked at [facility]. [facility] do offer several locations for blood pressure checks. Please follow the below link to schedule an appointment. Scheduling an appointment at the pharmacy for a blood pressure check is now preferred.    Appointment Plus ([email or web address]     Follow-up: 4-6 weeks with Weigh Management Provider - [phone]. as needed with Lauren Bloch MTM pharmacist     SUBJECTIVE/OBJECTIVE:                          Melyssa Kirk is a 52 year old female called for a follow-up visit.  Today's visit is a follow-up MTM visit from 1/2021. comprehensive review of medications due.      Reason for visit: comprehensive review of medications and would like to discuss topiramate restart. Patient reports she is working on establishing with new primary care provider.     Allergies/ADRs: Reviewed in chart  Past Medical History: Reviewed in chart  Tobacco: She reports that she has never smoked. She has never used smokeless tobacco.  Alcohol: not currently using    Medication Adherence/Access: no issues reported    Obesity:   Topiramate 100 mg in AM       Reports after tapering off topiramate in March, over the months after ended up gaining part of weight she had originally  "lost back. No side effects when was on topiramate. She self restarted topiramate 1 week ago and realized would be efficacious to follow up with provider before hand. No side effects - no drowsiness or cognitive issues reported with AM use of topiarmate. Was last seen by Lakisha Veloz PA-C 3/21/2022, Over the months, she has noticed more sporadic eating, more interest towards sweets, carbohydrates. She previously would plan her meals/snacks ahead of time but stopped doing this. She also noticed she has been more down and irritable during this time as well since off. She felt more \"even, regulated\" in her mood on the medication. She knows she does better when she does more consistent meal plan. She likes to follow IF like she did previously, 16-8 rule and planning meals out. She previously had been exercising more, but now over the last months she had been doing more traveling but less movement/activity. She just started with a group - group specifically designed for Black women and improving movement in a non-competitive way - going to be doing a biking portion of a triathlon - not competitive but goal of improving overall movement. Has been trying now to walk and swim more.      Current weight today: 217.6 lb    Initial Consult Weight 6/29/2020: 227 lb  Cumulative Weight Loss: -9.4 lb, 4.1% from baseline     Wt Readings from Last 4 Encounters:   03/21/22 205 lb (93 kg)   01/25/21 204 lb 3.2 oz (92.6 kg)   09/14/20 214 lb 9.6 oz (97.3 kg)   06/29/20 227 lb (103 kg)     Hypertension:   Lisinopril-hydrochlorothiazide 20-12.5 mg daily in AM.      Patient does not self-monitor BP. Does not have blood pressure monitor at home. Patient reports no current medication side effects. No reports of lightheadedness/dizziness.     11/04/2021: in clinic blood pressure 122/78  BP Readings from Last 3 Encounters:   10/23/20 (!) 134/98   09/15/20 126/87   05/19/15 124/82     Creatinine   Date Value Ref Range Status   10/23/2020 0.74 " 0.52 - 1.04 mg/dL Final     Potassium   Date Value Ref Range Status   10/23/2020 3.6 3.4 - 5.3 mmol/L Final   ----------------      I spent 20 minutes with this patient today. All changes were made via collaborative practice agreement with Lakisha Veloz PA-C. A copy of the visit note was provided to the patient's provider(s).    The patient was sent via [employer] a summary of these recommendations.     Lauren Bloch, PharmD, BCACP   Medication Therapy Management Pharmacist   [facility]      Telemedicine Visit Details  Type of service:  Telephone visit  Start Time: 11:30 AM  End Time: 11:50 AM  Originating Location (patient location): Home  Distant Location (provider location):  [facility]     Medication Therapy Recommendations  Benign essential hypertension    Current Medication: lisinopril-hydrochlorothiazide (ZESTORETIC) 20-12.5 MG tablet   Rationale: Medication requires monitoring - Needs additional monitoring   Recommendation: Self-Monitoring   Status: Patient Agreed - Adherence/Education         Class 2 severe obesity with serious comorbidity and body mass index (BMI) of 37.0 to 37.9 in adult, unspecified obesity type (H)    Rationale: Untreated condition - Needs additional medication therapy - Indication   Recommendation: Start Medication - topiramate 100 MG tablet   Status: Accepted per [facility]

## 2022-08-20 RX ORDER — TOPIRAMATE 100 MG/1
100 TABLET, FILM COATED ORAL DAILY
Qty: 30 TABLET | Refills: 1 | Status: SHIPPED | OUTPATIENT
Start: 2022-08-20 | End: 2024-02-06

## 2022-08-20 NOTE — PATIENT INSTRUCTIONS
"Recommendations from today's MTM visit:                                                    MTM (medication therapy management) is a service provided by a clinical pharmacist designed to help you get the most of out of your medicines.   Today we reviewed what your medicines are for, how to know if they are working, that your medicines are safe and how to make your medicine regimen as easy as possible.      1. Continue topiramate 100 mg daily - RX sent to pharmacy.     2. Get lab completed at [facility] in 4 weeks, BMP ordered.    3. Get blood pressure checked - can have checked at [facility]. [facility] do offer several locations for blood pressure checks. Please follow the below link to schedule an appointment. Scheduling an appointment at the pharmacy for a blood pressure check is now preferred.    Appointment Plus ([email or web address]     Follow-up: 4-6 weeks with Weigh Management Provider - [phone]. as needed with Lauren Bloch [facility] pharmacist     It was great speaking with you today.  I value your experience and would be very thankful for your time in providing feedback in our clinic survey. In the next few days, you may receive an email or text message from [employer] with a link to a survey related to your  clinical pharmacist.\"     My Clinical Pharmacist's contact information:                                                      Please feel free to contact me with any questions or concerns you have.      Lauren Bloch, PharmD  Medication Therapy Management Pharmacist   [facility] [place]             "

## 2022-10-22 ENCOUNTER — HEALTH MAINTENANCE LETTER (OUTPATIENT)
Age: 52
End: 2022-10-22

## 2023-04-01 ENCOUNTER — HEALTH MAINTENANCE LETTER (OUTPATIENT)
Age: 53
End: 2023-04-01

## 2023-08-17 NOTE — PROGRESS NOTES
"Assessment & Plan     Class 2 severe obesity with serious comorbidity and body mass index (BMI) of 37.0 to 37.9 in adult, unspecified obesity type (H)  Good success with weight loss so far.    PLAN:   Continue same plan with diet: IF and tracking calories  Continue exercise  Continue topiramate but switch to 100 mg daily.   Consider adding phentermine is plateau      30 minutes spent on the date of the encounter doing chart review, history and exam, documentation and further activities as noted above         BMI:   Estimated body mass index is 32.96 kg/m  as calculated from the following:    Height as of this encounter: 1.676 m (5' 6\").    Weight as of this encounter: 92.6 kg (204 lb 3.2 oz).   Weight management plan: this is her weight management visit      No follow-ups on file.    Teressa Alejo MD  [facility]     Melyssa Kirk is a 50 year old female who presents today for the following   health issues: obesity.     Initial visit with me on 6/29/2020. Weight then was 227 lbs.   At that point, I started her on topiramate.     Since last visit, her diet was not as strict over the holidays. Was snacking more due to stress. Then she stopped them.     Diet:   Intermittent fasting. Eats between 12-8 most days of the week.   Food prepping.   Usually 2 meals and a snack.  Logging her food intake: using [employer] pal. She is mostly ~ 9295-1968 Kcal.     Exercise: 3 days/week. Runs and jogs. Some yoga.     Meds:   Topiramate 50 mg BID.    She finds that it has decreased her appetite.   No side effects.     She has HTN. Well controlled on lisinipril/HCTZ.     Weight tracking:  Initial consult weight was 227 on 6/29/2020.  Weight change since last seen on 9/14//2020 is down 10 pounds.   Total loss is 23 pounds.    Review of Systems  Constitutional, HEENT, cardiovascular, pulmonary, gi and gu systems are negative, except as otherwise noted.    Objective   Ht " PHYSICAL / OCCUPATIONAL THERAPY - DAILY TREATMENT NOTE (updated )    Patient Name: Jody Payne    Date: 2023    : 2009  Insurance: Payor: Herber Valdes / Plan: Herber Valdes / Product Type: *No Product type* /      Patient  verified Yes     Visit #   Current / Total 15 26   Time   In / Out 11:10 11:53   Pain   In / Out 0/10 0/10   Subjective Functional Status/Changes: No new complaints. Changes to: Allergies, Med Hx, Sx Hx?   no       TREATMENT AREA =  Left knee pain [M25.562]    OBJECTIVE    Therapeutic Procedures: Tx Min Billable or 1:1 Min (if diff from Tx Min) Procedure, Rationale, Specifics   20  93522 Therapeutic Exercise (timed):  increase ROM, strength, coordination, balance, and proprioception to improve patient's ability to progress to PLOF and address remaining functional goals. (see flow sheet as applicable)     Details if applicable:       15  03584 Neuromuscular Re-Education (timed):  improve balance, coordination, kinesthetic sense, posture, core stability and proprioception to improve patient's ability to develop conscious control of individual muscles and awareness of position of extremities in order to progress to PLOF and address remaining functional goals. (see flow sheet as applicable)     Details if applicable:  Band-walks, SLS balance with ball toss, SLS with vectors, eccentric step downs. 8  92613 Therapeutic Activity (timed):  use of dynamic activities replicating functional movements to increase ROM, strength, coordination, balance, and proprioception in order to improve patient's ability to progress to PLOF and address remaining functional goals. (see flow sheet as applicable)     Details if applicable:  Squats, dynamic step ups.    [address] [place] [address] Totals Reminder: bill using total billable min of TIMED therapeutic procedures (example: do not include dry needle or estim unattended, both untimed codes, in totals to left)  8-22 min = 1 unit; 23-37 min = 2 units; "1.676 m (5' 6\")   Wt 92.6 kg (204 lb 3.2 oz)   BMI 32.96 kg/m    Body mass index is 32.96 kg/m .    Physical Exam  GENERAL: Healthy, alert and no distress  EYES: Eyes grossly normal to inspection.  No discharge or erythema, or obvious scleral/conjunctival abnormalities.  RESP: No audible wheeze, cough, or visible cyanosis.  No visible retractions or increased work of breathing.    SKIN: Visible skin clear. No significant rash, abnormal pigmentation or lesions.  NEURO: Cranial nerves grossly intact.  Mentation and speech appropriate for age.  PSYCH: Mentation appears normal, affect normal/bright, judgement and insight intact, normal speech and appearance well-groomed.       "

## 2024-02-06 ENCOUNTER — OFFICE VISIT (OUTPATIENT)
Dept: FAMILY MEDICINE | Facility: CLINIC | Age: 54
End: 2024-02-06
Payer: COMMERCIAL

## 2024-02-06 VITALS
BODY MASS INDEX: 38.45 KG/M2 | DIASTOLIC BLOOD PRESSURE: 80 MMHG | TEMPERATURE: 97.3 F | RESPIRATION RATE: 20 BRPM | HEIGHT: 65 IN | OXYGEN SATURATION: 98 % | HEART RATE: 80 BPM | SYSTOLIC BLOOD PRESSURE: 110 MMHG | WEIGHT: 230.8 LBS

## 2024-02-06 DIAGNOSIS — I10 ESSENTIAL HYPERTENSION: ICD-10-CM

## 2024-02-06 DIAGNOSIS — Z12.11 SCREEN FOR COLON CANCER: Primary | ICD-10-CM

## 2024-02-06 DIAGNOSIS — Z13.220 SCREENING FOR HYPERLIPIDEMIA: ICD-10-CM

## 2024-02-06 DIAGNOSIS — N95.1 MENOPAUSAL VASOMOTOR SYNDROME: ICD-10-CM

## 2024-02-06 DIAGNOSIS — Z12.31 ENCOUNTER FOR SCREENING MAMMOGRAM FOR BREAST CANCER: ICD-10-CM

## 2024-02-06 DIAGNOSIS — Z13.1 ENCOUNTER FOR SCREENING FOR DIABETES MELLITUS: ICD-10-CM

## 2024-02-06 DIAGNOSIS — Z76.89 ENCOUNTER TO ESTABLISH CARE WITH NEW DOCTOR: ICD-10-CM

## 2024-02-06 DIAGNOSIS — E66.01 CLASS 2 SEVERE OBESITY DUE TO EXCESS CALORIES WITH SERIOUS COMORBIDITY AND BODY MASS INDEX (BMI) OF 36.0 TO 36.9 IN ADULT (H): ICD-10-CM

## 2024-02-06 DIAGNOSIS — E66.812 CLASS 2 SEVERE OBESITY DUE TO EXCESS CALORIES WITH SERIOUS COMORBIDITY AND BODY MASS INDEX (BMI) OF 36.0 TO 36.9 IN ADULT (H): ICD-10-CM

## 2024-02-06 LAB — HBA1C MFR BLD: 5.4 % (ref 0–5.6)

## 2024-02-06 PROCEDURE — 84443 ASSAY THYROID STIM HORMONE: CPT | Performed by: NURSE PRACTITIONER

## 2024-02-06 PROCEDURE — 82306 VITAMIN D 25 HYDROXY: CPT | Performed by: NURSE PRACTITIONER

## 2024-02-06 PROCEDURE — 80053 COMPREHEN METABOLIC PANEL: CPT | Performed by: NURSE PRACTITIONER

## 2024-02-06 PROCEDURE — 83036 HEMOGLOBIN GLYCOSYLATED A1C: CPT | Performed by: NURSE PRACTITIONER

## 2024-02-06 PROCEDURE — 36415 COLL VENOUS BLD VENIPUNCTURE: CPT | Performed by: NURSE PRACTITIONER

## 2024-02-06 PROCEDURE — 99204 OFFICE O/P NEW MOD 45 MIN: CPT | Performed by: NURSE PRACTITIONER

## 2024-02-06 PROCEDURE — 80061 LIPID PANEL: CPT | Performed by: NURSE PRACTITIONER

## 2024-02-06 PROCEDURE — 83001 ASSAY OF GONADOTROPIN (FSH): CPT | Performed by: NURSE PRACTITIONER

## 2024-02-06 PROCEDURE — 82607 VITAMIN B-12: CPT | Performed by: NURSE PRACTITIONER

## 2024-02-06 PROCEDURE — 84146 ASSAY OF PROLACTIN: CPT | Performed by: NURSE PRACTITIONER

## 2024-02-06 RX ORDER — HYDROCHLOROTHIAZIDE 12.5 MG/1
12.5 TABLET ORAL DAILY
Qty: 90 TABLET | Refills: 3 | Status: SHIPPED | OUTPATIENT
Start: 2024-02-06

## 2024-02-06 RX ORDER — HYDROCHLOROTHIAZIDE 12.5 MG/1
1 TABLET ORAL DAILY
COMMUNITY
Start: 2023-08-25 | End: 2024-02-06

## 2024-02-06 RX ORDER — CLONIDINE HYDROCHLORIDE 0.1 MG/1
0.1 TABLET ORAL 2 TIMES DAILY
COMMUNITY

## 2024-02-06 RX ORDER — AMLODIPINE BESYLATE 10 MG/1
10 TABLET ORAL EVERY MORNING
Qty: 90 TABLET | Refills: 3 | Status: SHIPPED | OUTPATIENT
Start: 2024-02-06

## 2024-02-06 RX ORDER — AMLODIPINE BESYLATE 10 MG/1
10 TABLET ORAL EVERY MORNING
COMMUNITY
Start: 2024-01-10 | End: 2024-02-06

## 2024-02-06 ASSESSMENT — PAIN SCALES - GENERAL: PAINLEVEL: NO PAIN (0)

## 2024-02-06 NOTE — PROGRESS NOTES
"  Assessment & Plan     Encounter to establish care with new doctor  Reviewed chronic health conditions; medications, labs and pertinent health concerns today  - Lipid panel reflex to direct LDL Non-fasting  - Glucose  - Follicle stimulating hormone  - Lipid Profile  - Hemoglobin A1c  - Comprehensive metabolic panel  - TSH with free T4 reflex  - Lipid panel reflex to direct LDL Non-fasting  - Glucose  - Follicle stimulating hormone  - Hemoglobin A1c  - Comprehensive metabolic panel  - TSH with free T4 reflex    Screen for colon cancer  - will obtain report    Essential hypertension  For your blood pressure:  Check your blood pressure once a day  It can be at different times of day, but you should be sitting restfully for ~10 minutes before you take it.  Note your blood pressure on a paper log or on your phone  In ~2 weeks, send me a message on [employer] with your results.  Your goal is <140/90, even better is <130/80.  Low sodium, high potassium (DASH) diet.  renewed current regiment  - amLODIPine (NORVASC) 10 MG tablet  Dispense: 90 tablet; Refill: 3  - hydrochlorothiazide (HYDRODIURIL) 12.5 MG tablet  Dispense: 90 tablet; Refill: 3    Class 2 severe obesity due to excess calories with serious comorbidity and body mass index (BMI) of 36.0 to 36.9 in adult (H)    Estimated body mass index is 38.92 kg/m  as calculated from the following:    Height as of this encounter: 1.64 m (5' 4.57\").    Weight as of this encounter: 104.7 kg (230 lb 12.8 oz).  discussed treatment options w/phentermine, topiramate, natrexone, wellbutrin and glp-1 therapy; reviewed side effects of all medications; discussed and lifestyle changes including starting an exercise/activity program 30 minutes daily, daily caloric/cho/protein; meal tracking;    - reduce caloric intake 1800-2K per day; cho 100-150 gms per meal; protein 25-30 gm per meal  - check nutritional status; A1c, Lipid, Tsh, Vit D, B12    - Vitamin D Deficiency  - Vitamin " "B12        Encounter for screening mammogram for breast cancer  - MA Screen Bilateral w/Braxton    Menopausal vasomotor syndrome  - Follicle stimulating hormone  - Prolactin  - Follicle stimulating hormone  - Vitamin D Deficiency  - Vitamin B12    Encounter for screening for diabetes mellitus  - Hemoglobin A1c    Screening for hyperlipidemia  The 10-year ASCVD risk score (Hafsa HAJI, et al., 2019) is: 2.8%    Values used to calculate the score:      Age: 53 years      Sex: Female      Is Non- : Yes      Diabetic: No      Tobacco smoker: No      Systolic Blood Pressure: 114 mmHg      Is BP treated: Yes      HDL Cholesterol: 74 mg/dL      Total Cholesterol: 257 mg/dL  - Lipid Profile              BMI  Estimated body mass index is 38.92 kg/m  as calculated from the following:    Height as of this encounter: 1.64 m (5' 4.57\").    Weight as of this encounter: 104.7 kg (230 lb 12.8 oz).         There are no Patient Instructions on file for this visit.    Herminia Ragsdale is a 53 year old, presenting for the following health issues:  Establish Care and Consult (On over eating was in a program would like comitant care )        2/6/2024     3:23 PM   Additional Questions   Roomed by Priscila   Accompanied by alone         2/6/2024     3:23 PM   Patient Reported Additional Medications   Patient reports taking the following new medications none     Via the Health Maintenance questionnaire, the patient has reported the following services have been completed , this information has been sent to the abstraction team.    # establish care:   PMH:   HTN:currently on amlodipine and hydrochlorothiazide  Vasomotor symptoms  Obesity: Weight management;comulsive over eating  19 to age 40s Treatment in [place] for weight mangement     History of Present Illness       Reason for visit:  To eatablish care  ,dicuss my past and preset medical challenges and create a long term careplan    She eats 4 or more servings of fruits and " "vegetables daily.She consumes 0 sweetened beverage(s) daily.She exercises with enough effort to increase her heart rate 30 to 60 minutes per day.  She exercises with enough effort to increase her heart rate 4 days per week.   She is taking medications regularly.                     Objective    /80 (BP Location: Right arm, Patient Position: Sitting, Cuff Size: Adult Large)   Pulse 80   Temp 97.3  F (36.3  C) (Temporal)   Resp 20   Ht 1.64 m (5' 4.57\")   Wt 104.7 kg (230 lb 12.8 oz)   LMP 10/04/2023   SpO2 98%   BMI 38.92 kg/m    Body mass index is 38.92 kg/m .  Physical Exam               Signed Electronically by: BENNY Freire CNP    "

## 2024-02-08 LAB
ALBUMIN SERPL BCG-MCNC: 4.1 G/DL (ref 3.5–5.2)
ALP SERPL-CCNC: 93 U/L (ref 40–150)
ALT SERPL W P-5'-P-CCNC: 16 U/L (ref 0–50)
ANION GAP SERPL CALCULATED.3IONS-SCNC: 10 MMOL/L (ref 7–15)
AST SERPL W P-5'-P-CCNC: 23 U/L (ref 0–45)
BILIRUB SERPL-MCNC: 0.2 MG/DL
BUN SERPL-MCNC: 16.3 MG/DL (ref 6–20)
CALCIUM SERPL-MCNC: 9.2 MG/DL (ref 8.6–10)
CHLORIDE SERPL-SCNC: 107 MMOL/L (ref 98–107)
CHOLEST SERPL-MCNC: 257 MG/DL
CREAT SERPL-MCNC: 0.65 MG/DL (ref 0.51–0.95)
DEPRECATED HCO3 PLAS-SCNC: 25 MMOL/L (ref 22–29)
EGFRCR SERPLBLD CKD-EPI 2021: >90 ML/MIN/1.73M2
FASTING STATUS PATIENT QL REPORTED: ABNORMAL
FASTING STATUS PATIENT QL REPORTED: NORMAL
FSH SERPL IRP2-ACNC: 45.2 MIU/ML
GLUCOSE SERPL-MCNC: 84 MG/DL (ref 70–99)
GLUCOSE SERPL-MCNC: 84 MG/DL (ref 70–99)
HDLC SERPL-MCNC: 74 MG/DL
LDLC SERPL CALC-MCNC: 165 MG/DL
NONHDLC SERPL-MCNC: 183 MG/DL
POTASSIUM SERPL-SCNC: 3.8 MMOL/L (ref 3.4–5.3)
PROLACTIN SERPL 3RD IS-MCNC: 17 NG/ML (ref 5–23)
PROT SERPL-MCNC: 7.6 G/DL (ref 6.4–8.3)
SODIUM SERPL-SCNC: 142 MMOL/L (ref 135–145)
TRIGL SERPL-MCNC: 88 MG/DL
TSH SERPL DL<=0.005 MIU/L-ACNC: 3.39 UIU/ML (ref 0.3–4.2)
VIT B12 SERPL-MCNC: 638 PG/ML (ref 232–1245)
VIT D+METAB SERPL-MCNC: 13 NG/ML (ref 20–50)

## 2024-02-14 ENCOUNTER — MYC MEDICAL ADVICE (OUTPATIENT)
Dept: FAMILY MEDICINE | Facility: CLINIC | Age: 54
End: 2024-02-14
Payer: COMMERCIAL

## 2024-02-14 DIAGNOSIS — E66.812 CLASS 2 SEVERE OBESITY DUE TO EXCESS CALORIES WITH SERIOUS COMORBIDITY AND BODY MASS INDEX (BMI) OF 36.0 TO 36.9 IN ADULT (H): Primary | ICD-10-CM

## 2024-02-14 DIAGNOSIS — E66.01 CLASS 2 SEVERE OBESITY DUE TO EXCESS CALORIES WITH SERIOUS COMORBIDITY AND BODY MASS INDEX (BMI) OF 36.0 TO 36.9 IN ADULT (H): Primary | ICD-10-CM

## 2024-02-18 ENCOUNTER — TELEPHONE (OUTPATIENT)
Dept: FAMILY MEDICINE | Facility: CLINIC | Age: 54
End: 2024-02-18
Payer: COMMERCIAL

## 2024-02-20 ENCOUNTER — OFFICE VISIT (OUTPATIENT)
Dept: FAMILY MEDICINE | Facility: CLINIC | Age: 54
End: 2024-02-20
Payer: COMMERCIAL

## 2024-02-20 VITALS
BODY MASS INDEX: 38.79 KG/M2 | RESPIRATION RATE: 20 BRPM | HEART RATE: 77 BPM | TEMPERATURE: 97.3 F | SYSTOLIC BLOOD PRESSURE: 114 MMHG | OXYGEN SATURATION: 96 % | DIASTOLIC BLOOD PRESSURE: 80 MMHG | WEIGHT: 230 LBS

## 2024-02-20 DIAGNOSIS — E55.9 AVITAMINOSIS D: ICD-10-CM

## 2024-02-20 DIAGNOSIS — E66.812 CLASS 2 SEVERE OBESITY DUE TO EXCESS CALORIES WITH SERIOUS COMORBIDITY AND BODY MASS INDEX (BMI) OF 36.0 TO 36.9 IN ADULT (H): Primary | ICD-10-CM

## 2024-02-20 DIAGNOSIS — E66.01 CLASS 2 SEVERE OBESITY DUE TO EXCESS CALORIES WITH SERIOUS COMORBIDITY AND BODY MASS INDEX (BMI) OF 36.0 TO 36.9 IN ADULT (H): Primary | ICD-10-CM

## 2024-02-20 PROCEDURE — 99214 OFFICE O/P EST MOD 30 MIN: CPT | Mod: 25 | Performed by: NURSE PRACTITIONER

## 2024-02-20 PROCEDURE — 93000 ELECTROCARDIOGRAM COMPLETE: CPT | Performed by: NURSE PRACTITIONER

## 2024-02-20 RX ORDER — ERGOCALCIFEROL 1.25 MG/1
50000 CAPSULE, LIQUID FILLED ORAL
Qty: 8 CAPSULE | Refills: 0 | Status: SHIPPED | OUTPATIENT
Start: 2024-02-20 | End: 2024-04-10

## 2024-02-20 RX ORDER — HYDROCHLOROTHIAZIDE 12.5 MG/1
1 CAPSULE ORAL DAILY
COMMUNITY
Start: 2024-01-10 | End: 2024-02-20

## 2024-02-20 RX ORDER — PHENTERMINE HYDROCHLORIDE 15 MG/1
15 CAPSULE ORAL EVERY MORNING
Qty: 42 CAPSULE | Refills: 0 | Status: SHIPPED | OUTPATIENT
Start: 2024-02-20 | End: 2024-05-08

## 2024-02-20 RX ORDER — TOPIRAMATE 25 MG/1
TABLET, FILM COATED ORAL
Qty: 42 TABLET | Refills: 0 | Status: SHIPPED | OUTPATIENT
Start: 2024-02-20 | End: 2024-04-09

## 2024-02-20 ASSESSMENT — PAIN SCALES - GENERAL: PAINLEVEL: NO PAIN (0)

## 2024-02-20 NOTE — PROGRESS NOTES
"  Assessment & Plan     Class 2 severe obesity due to excess calories with serious comorbidity and body mass index (BMI) of 36.0 to 36.9 in adult (H)  Estimated body mass index is 38.79 kg/m  as calculated from the following:    Height as of 2/6/24: 1.64 m (5' 4.57\").    Weight as of this encounter: 104.3 kg (230 lb).    Weight goal long-term 170 lbs;  Short-term goal: <200    Long-term difficultly with weight loss; highest weight 360 lbs age 29's with successful weight loss in 2008 down to 150 lbs maintained weight loss until ~2014-5 weight increased to 233 lbs w/ fluctuations over the years to final weight today 230 lbs.  Patient follows with Nutritionist for weight management every 2 weeks; has set meal plans and very active 4-5 times a week.     Food: eating 3 meals a day with 2 snack after lunch and dinner; not particularly hungry for snack but eats per plan; has constant thinking about food and cravings; has reduces carb and calories; getting 30-35 gm of protein each meal.    - PA for Zepbound denied by insurance-\"Excluded medications cannot be appealed through the patient's pharmacy benefits as the medication or the class of medication is excluded and not on the drug formulary\"     discussed other treatment options w/phentermine, topiramate, natrexone, wellbutrin and glp-1 therapy; reviewed side effects of all medications;     -PA entered for Semaglutide-Weight Management (WEGOVY) 0.25 MG/0.5ML pen  Dispense: 2 mL; Refill: 0  - will trial Phentermine/Topiramate pending approval above; discuss/reviewed cardiac risk; htn controlled with current regimen; no known CAD; will obtain ECG today prior to phentermine start   - EKG 12-lead complete w/read - Clinics  - phentermine (ADIPEX-P) 15 MG capsule  Dispense: 42 capsule; Refill: 0  - topiramate (TOPAMAX) 25 MG tablet  Dispense: 42 tablet; Refill: 0    Avitaminosis D   Vitamin d 13  - vitamin D2 (ERGOCALCIFEROL) 72552 units (1250 mcg) capsule  Dispense: 8 capsule; " "Refill: 0              BMI  Estimated body mass index is 38.79 kg/m  as calculated from the following:    Height as of 2/6/24: 1.64 m (5' 4.57\").    Weight as of this encounter: 104.3 kg (230 lb).             Herminia Ragsdale is a 53 year old, presenting for the following health issues:  Follow Up (Labs/medication)      2/20/2024     1:53 PM   Additional Questions   Roomed by Priscila   Accompanied by alone         2/20/2024     1:53 PM   Patient Reported Additional Medications   Patient reports taking the following new medications none     History of Present Illness       Reason for visit:  Weight and blood pressure    She eats 4 or more servings of fruits and vegetables daily.She consumes 0 sweetened beverage(s) daily.She exercises with enough effort to increase her heart rate 30 to 60 minutes per day.  She exercises with enough effort to increase her heart rate 4 days per week.   She is taking medications regularly.     Weight management:  Food: following RD planned meals every 2 weeks  Breakfast: 2-3 oz protein-eggs or yogurt;  6 oz fruit 1oz cereal  Snack: none  Lunch 4 oz protein, 3oz carb; 6-8 oz veggie;   Snack cheese; almond butter ; 4oz fruit   Dinner same lunch  Snack: same as mid-day snack  Water, sparkling     Has tried intermittent was not successful; cravings; constant thinking about food  Activities: treadmill 4 times a week; yoga;weight     Weight HX:   Lowest weight 150 lbs [id number]  Highest weight 360 age 29's                  Objective    /80 (BP Location: Right arm, Patient Position: Sitting, Cuff Size: Adult Large)   Pulse 77   Temp 97.3  F (36.3  C) (Temporal)   Resp 20   Wt 104.3 kg (230 lb)   LMP 10/04/2023   SpO2 96%   BMI 38.79 kg/m    Body mass index is 38.79 kg/m .  Physical Exam               Signed Electronically by: BENNY Freire CNP    "

## 2024-02-21 ENCOUNTER — TELEPHONE (OUTPATIENT)
Dept: FAMILY MEDICINE | Facility: CLINIC | Age: 54
End: 2024-02-21
Payer: COMMERCIAL

## 2024-02-21 NOTE — TELEPHONE ENCOUNTER
Prior Authorization Retail Medication Request    Medication/Dose:   Diagnosis and ICD code (if different than what is on RX):  Class 2 severe obesity due to excess calories with serious comorbidity and body mass index (BMI) of 36.0 to 36.9 in adult (H) [E66.01, Z68.36]       Insurance   Primary:[employer] OPEN ACCESS   Insurance ID:  [id number] \    Pharmacy Information (if different than what is on RX)  Name:    [facility]   [place] [address]   Phone:  [phone]   Fax:  [phone]

## 2024-03-04 NOTE — TELEPHONE ENCOUNTER
Prior Authorization Retail Medication Request    Medication/Dose: Requesting Wegovy 0.25mg/0.5ml auto-injectors   Diagnosis and ICD code (if different than what is on RX):  Class 2 severe obesity due to excess calories with serious comorbidity and body mass index (BMI) of 36.0 to 36.9 in adult (H) [E66.01, Z68.36]       Insurance   Primary:[facility] OPEN ACCESS   Insurance ID:  [id number] \    Pharmacy Information (if different than what is on RX)  Name:    [facility]   [place] [address]   Phone:  [phone]   Fax:  [phone]

## 2024-04-09 DIAGNOSIS — E66.812 CLASS 2 SEVERE OBESITY DUE TO EXCESS CALORIES WITH SERIOUS COMORBIDITY AND BODY MASS INDEX (BMI) OF 36.0 TO 36.9 IN ADULT (H): ICD-10-CM

## 2024-04-09 DIAGNOSIS — E66.01 CLASS 2 SEVERE OBESITY DUE TO EXCESS CALORIES WITH SERIOUS COMORBIDITY AND BODY MASS INDEX (BMI) OF 36.0 TO 36.9 IN ADULT (H): ICD-10-CM

## 2024-04-09 RX ORDER — TOPIRAMATE 100 MG/1
100 TABLET, FILM COATED ORAL
Qty: 90 TABLET | Refills: 3 | Status: SHIPPED | OUTPATIENT
Start: 2024-04-09 | End: 2024-05-08

## 2024-05-08 ENCOUNTER — OFFICE VISIT (OUTPATIENT)
Dept: FAMILY MEDICINE | Facility: CLINIC | Age: 54
End: 2024-05-08
Payer: COMMERCIAL

## 2024-05-08 VITALS
OXYGEN SATURATION: 99 % | BODY MASS INDEX: 37.99 KG/M2 | DIASTOLIC BLOOD PRESSURE: 82 MMHG | SYSTOLIC BLOOD PRESSURE: 124 MMHG | RESPIRATION RATE: 20 BRPM | WEIGHT: 228 LBS | TEMPERATURE: 97.2 F | HEART RATE: 66 BPM | HEIGHT: 65 IN

## 2024-05-08 DIAGNOSIS — E66.01 CLASS 2 SEVERE OBESITY DUE TO EXCESS CALORIES WITH SERIOUS COMORBIDITY AND BODY MASS INDEX (BMI) OF 36.0 TO 36.9 IN ADULT (H): Primary | ICD-10-CM

## 2024-05-08 DIAGNOSIS — E66.812 CLASS 2 SEVERE OBESITY DUE TO EXCESS CALORIES WITH SERIOUS COMORBIDITY AND BODY MASS INDEX (BMI) OF 36.0 TO 36.9 IN ADULT (H): Primary | ICD-10-CM

## 2024-05-08 PROCEDURE — 99214 OFFICE O/P EST MOD 30 MIN: CPT | Performed by: NURSE PRACTITIONER

## 2024-05-08 RX ORDER — TOPIRAMATE 100 MG/1
TABLET, FILM COATED ORAL
Qty: 90 TABLET | Refills: 3 | Status: SHIPPED | OUTPATIENT
Start: 2024-05-08

## 2024-05-08 ASSESSMENT — PAIN SCALES - GENERAL: PAINLEVEL: NO PAIN (0)

## 2024-05-08 NOTE — PROGRESS NOTES
"  Assessment & Plan     Class 2 severe obesity due to excess calories with serious comorbidity and body mass index (BMI) of 36.0 to 36.9 in adult (H)  Wt Readings from Last 5 Encounters:   05/08/24 103.4 kg (228 lb)   02/20/24 104.3 kg (230 lb)   02/06/24 104.7 kg (230 lb 12.8 oz)   03/21/22 93 kg (205 lb)   01/25/21 92.6 kg (204 lb 3.2 oz)     Estimated body mass index is 38.45 kg/m  as calculated from the following:    Height as of this encounter: 1.64 m (5' 4.57\").    Weight as of this encounter: 103.4 kg (228 lb).  - continue lifestyle changes: exercise/activity program 30 minutes daily,   - daily monitoring of caloric/cho/protein  - reduce caloric intake 0686-2053 per day; cho 100-150 gms per day; protein 25-30 gm per meal; no snacking  - wishes to start FV compound semaglutide OOP payment; will start topamax 50 mg before breakfast and continue 100 mg at dinner; MTM placed for assistance with compound management  - Med Therapy Management Referral  - topiramate (TOPAMAX) 100 MG tablet  Dispense: 90 tablet; Refill: 3  - follow up in 3 months virtual/ in person             BMI  Estimated body mass index is 38.45 kg/m  as calculated from the following:    Height as of this encounter: 1.64 m (5' 4.57\").    Weight as of this encounter: 103.4 kg (228 lb).             Herminia Ragsdale is a 53 year old, presenting for the following health issues:  Follow Up        5/8/2024    10:20 AM   Additional Questions   Roomed by Priscila   Accompanied by alone         5/8/2024    10:20 AM   Patient Reported Additional Medications   Patient reports taking the following new medications none     History of Present Illness       Hypertension: She presents for follow up of hypertension.  She does not check blood pressure  regularly outside of the clinic. Outside blood pressures have been over 140/90. She follows a low salt diet.     Reason for visit:  Follow-up on medications, weight management, blood pressure, request for " "semiglutides    She eats 4 or more servings of fruits and vegetables daily.She consumes 0 sweetened beverage(s) daily.She exercises with enough effort to increase her heart rate 30 to 60 minutes per day.  She exercises with enough effort to increase her heart rate 4 days per week.   She is taking medications regularly.                     Objective    /82 (BP Location: Right arm, Patient Position: Sitting, Cuff Size: Adult Regular)   Pulse 66   Temp 97.2  F (36.2  C) (Temporal)   Resp 20   Ht 1.64 m (5' 4.57\")   Wt 103.4 kg (228 lb)   LMP 10/12/2023   SpO2 99%   BMI 38.45 kg/m    Body mass index is 38.45 kg/m .  Physical Exam               Signed Electronically by: BENNY Freire CNP    "

## 2024-05-08 NOTE — Clinical Note
Blake, I am starting her on compound semaglutide would you pls assist with this. I discussed pricing OOP and available while shortage of glp-1.

## 2024-05-17 ENCOUNTER — MYC MEDICAL ADVICE (OUTPATIENT)
Dept: FAMILY MEDICINE | Facility: CLINIC | Age: 54
End: 2024-05-17
Payer: COMMERCIAL

## 2024-05-17 DIAGNOSIS — E66.812 CLASS 2 SEVERE OBESITY DUE TO EXCESS CALORIES WITH SERIOUS COMORBIDITY AND BODY MASS INDEX (BMI) OF 36.0 TO 36.9 IN ADULT (H): ICD-10-CM

## 2024-05-17 DIAGNOSIS — E66.01 CLASS 2 SEVERE OBESITY DUE TO EXCESS CALORIES WITH SERIOUS COMORBIDITY AND BODY MASS INDEX (BMI) OF 36.0 TO 36.9 IN ADULT (H): ICD-10-CM

## 2024-05-17 NOTE — TELEPHONE ENCOUNTER
Pt called stating she saw Rosalva on 5/8/24. They discussed starting FV Compound semaglutide. Pt was advised to see jones for medication teaching to learn how to use rx. Pt was able to get an appt to see jones but not until July. They advised pt that she could learn how to use rx by watching a video and would like pt to start using medication. Pt is requesting PCP to send rx to her pharmacy.    - could not pend rx.        Bruce Norton Cem Say, BSN RN  [facility]

## 2024-05-30 NOTE — TELEPHONE ENCOUNTER
Patient calling in again    In reading Rosalva's note, she wanted patient to see MTM for compounding management    I gave her MTM scheduling # and transferred her there to see JESSICA Thomas RN  [facility]

## 2024-06-02 ENCOUNTER — HEALTH MAINTENANCE LETTER (OUTPATIENT)
Age: 54
End: 2024-06-02

## 2024-06-18 NOTE — PROGRESS NOTES
Medication Therapy Management (MTM) Encounter    ASSESSMENT:                            Medication Adherence/Access: No issues identified    Class II Obesity (BMI 35 to 39.9):   Patient would benefit from  : continue with topiramate and start fv cmpd. Semaglutide 0.25mg/week injection --mtm educated her on potential side effects  and lifestyle modifications. See plan for details.     Hypertension:   Stable.      PLAN:                                  1. Clinic weight today is 230.3lbs -- you desire to weight 165-170lbs --please start the Semaglutide 0.25mg./week dose on 6-28-24-- see how it goes --if doing well in 2 weeks --message me for higher dose rx to send to the pharmacy .  Remember- nausea and potentially vomiting are side effects if you overeat --stop eating at first sign of fullness, if constipation occurs -- drink daily glass of miralax.  Remember 1lb./week wrt.loss is a very good goal !    Stay active with daily exercise, eat proteins first when you eat then a little carbs as needed.         Follow-up: Return in about 26 days (around 7/16/2024), or @ 4;30 PM, for Weight loss, Medication Therapy Management Visit, BP Recheck.    SUBJECTIVE/OBJECTIVE:                          Melyssa Kirk is a 53 year old female seen for an initial visit. She was referred to me from Rosalva Carter  . Patient was accompanied by partner Halle .     Reason for visit:   get started on compound semaglutide will return to pcp for monitoring     Allergies/ADRs: Reviewed in chart  Past Medical History: Reviewed in chart  Tobacco: She reports that she has never smoked. She has never used smokeless tobacco.  Alcohol: not currently using  Other Substance Use: none   E-cigarette Use: none   Caffeine: none   Social: entreprenuer--sit down job.   Personal Healthcare Goals: wt.,loss goal: 165-170's--last time there at age- 43ish.   Activity: seasonally --swim, jog, bike, walk       Medication Adherence/Access: no issues reported    Weight  "Management   Topiramate 100mg tabs -- 1 tab daily in am - medium appetite suppression , not taking Pm dose due to exhaustion, not as hungry then .   Was in treatment for food addiction last year.   Pcp sent in semaglutide 6-11-24--earliest she could get drug is 6-24-24 --traveling will receive drug 6-27-24. She would like to know potentia  side effects etc.   Did see my mtm colleague jones in the past at [facility] Management clinic.   Patient reports no current medication side effects.  Nutrition/Eating Habits:   Bfast- yogurt , berries, eggs, veggies-spinach , mushrooms, smoothie -yogurt,berries banana, protein powder  Am snacker -no  Lunch: chicken, beef, pomini-veggie noodles, ribs, greens, ezekial wraps, salmon  Late afternoon--cheese/crackers, apple, almond butter   Dinner: same as lunch foods.+ salads, potatos sometimes.  Late night snacks : yogurt, almond butter , blueberries.  Kombucha.       Exercise/Activity: see activity above.  Medication History:  Topiramate:       Wt Readings from Last 4 Encounters:   06/20/24 230 lb 4.8 oz (104.5 kg)   05/08/24 228 lb (103.4 kg)   02/20/24 230 lb (104.3 kg)   02/06/24 230 lb 12.8 oz (104.7 kg)     Estimated body mass index is 38.84 kg/m  as calculated from the following:    Height as of 5/8/24: 5' 4.57\" (1.64 m).    Weight as of this encounter: 230 lb 4.8 oz (104.5 kg).      Hypertension:   Amlodipine 10mg /day in am .   Hydrochlorothiazide 12.5mg /day   Patient reports no current medication side effects.  Patient does not self-monitor blood pressure.    BP Readings from Last 3 Encounters:   06/20/24 120/78   05/08/24 124/82   02/20/24 114/80         Today's Vitals: /78   Pulse 55   Wt 230 lb 4.8 oz (104.5 kg)   LMP 10/12/2023   SpO2 99%   BMI 38.84 kg/m    ----------------      I spent 50 minutes with this patient today. All changes were made via collaborative practice agreement with BENNY Freire CNP. A copy of the visit note was provided to the " patient's provider(s).    A summary of these recommendations was given to the patient.    Theresa Casillas [facility].  Medication Therapy Management Provider  [phone]           Medication Therapy Recommendations  Class 2 severe obesity with serious comorbidity and body mass index (BMI) of 37.0 to 37.9 in adult, unspecified obesity type (H)    Current Medication: COMPOUNDED NON-CONTROLLED SUBSTANCE (CMPD RX) - PHARMACY TO MIX COMPOUNDED MEDICATION   Rationale: Synergistic therapy - Needs additional medication therapy - Indication   Recommendation: Start Medication - start 0.25mg./week semaglutide on 6-28-24.   Status: Accepted per CPA

## 2024-06-20 ENCOUNTER — OFFICE VISIT (OUTPATIENT)
Dept: PHARMACY | Facility: CLINIC | Age: 54
End: 2024-06-20
Payer: COMMERCIAL

## 2024-06-20 VITALS
HEART RATE: 55 BPM | WEIGHT: 230.3 LBS | OXYGEN SATURATION: 99 % | BODY MASS INDEX: 38.84 KG/M2 | SYSTOLIC BLOOD PRESSURE: 120 MMHG | DIASTOLIC BLOOD PRESSURE: 78 MMHG

## 2024-06-20 DIAGNOSIS — I10 BENIGN ESSENTIAL HYPERTENSION: ICD-10-CM

## 2024-06-20 DIAGNOSIS — E66.812 CLASS 2 SEVERE OBESITY WITH SERIOUS COMORBIDITY AND BODY MASS INDEX (BMI) OF 37.0 TO 37.9 IN ADULT, UNSPECIFIED OBESITY TYPE (H): Primary | ICD-10-CM

## 2024-06-20 DIAGNOSIS — E66.01 CLASS 2 SEVERE OBESITY WITH SERIOUS COMORBIDITY AND BODY MASS INDEX (BMI) OF 37.0 TO 37.9 IN ADULT, UNSPECIFIED OBESITY TYPE (H): Primary | ICD-10-CM

## 2024-06-20 PROCEDURE — 99605 MTMS BY PHARM NP 15 MIN: CPT | Performed by: PHARMACIST

## 2024-06-20 PROCEDURE — 99607 MTMS BY PHARM ADDL 15 MIN: CPT | Performed by: PHARMACIST

## 2024-06-20 NOTE — PATIENT INSTRUCTIONS
"Recommendations from today's MTM visit:                                                    MTM (medication therapy management) is a service provided by a clinical pharmacist designed to help you get the most of out of your medicines.   Today we reviewed what your medicines are for, how to know if they are working, that your medicines are safe and how to make your medicine regimen as easy as possible.      1. Clinic weight today is 230.3lbs -- you desire to weight 165-170lbs --please start the Semaglutide 0.25mg./week dose on 6-28-24-- see how it goes --if doing well in 2 weeks --message me for higher dose rx to send to the pharmacy .  Remember- nausea and potentially vomiting are side effects if you overeat --stop eating at first sign of fullness, if constipation occurs -- drink daily glass of miralax.  Remember 1lb./week wrt.loss is a very good goal !    Stay active with daily exercise, eat proteins first when you eat then a little carbs as needed.         Follow-up: Return in about 26 days (around 7/16/2024), or @ 4;30 PM, for Weight loss, Medication Therapy Management Visit, BP Recheck.    It was great speaking with you today.  I value your experience and would be very thankful for your time in providing feedback in our clinic survey. In the next few days, you may receive an email or text message from [employer] with a link to a survey related to your  clinical pharmacist.\"     To schedule another MTM appointment, please call the clinic directly or you may call the MTM scheduling line at [phone] or toll-free at [phone].     My Clinical Pharmacist's contact information:                                                      Please feel free to contact me with any questions or concerns you have.      Theresa Casillas [facility].  Medication Therapy Management Provider  [phone]    "

## 2024-07-09 ENCOUNTER — TELEPHONE (OUTPATIENT)
Dept: FAMILY MEDICINE | Facility: CLINIC | Age: 54
End: 2024-07-09
Payer: COMMERCIAL

## 2024-07-09 NOTE — TELEPHONE ENCOUNTER
MTM appointment canceled, we made one more attempt to reschedule.Cancellation reason: looks like patient had a recent stroke and is currently in ICU     Routing back to referring provider and MTM Pharmacist Team        Yoan Mueller  MTM

## 2024-08-11 ENCOUNTER — HEALTH MAINTENANCE LETTER (OUTPATIENT)
Age: 54
End: 2024-08-11

## 2024-08-26 ENCOUNTER — DOCUMENTATION ONLY (OUTPATIENT)
Dept: OTHER | Facility: CLINIC | Age: 54
End: 2024-08-26
Payer: COMMERCIAL

## 2024-10-18 ENCOUNTER — TELEPHONE (OUTPATIENT)
Dept: FAMILY MEDICINE | Facility: CLINIC | Age: 54
End: 2024-10-18
Payer: COMMERCIAL

## 2024-10-18 NOTE — TELEPHONE ENCOUNTER
[facility] calling    Verbal order requested from PCP for delayed start of care for services due to outside of 48 hours. Need to start Monday 10/21    Orders came from [facility] - caller said pt was discharged from [facility] today    Pt has not been seen since 5/8/24    Can covering provider ok this?    Belia was told that pt will need follow up appt soon    Angela WORLEY RN, BSN  [facility]

## 2024-10-21 NOTE — TELEPHONE ENCOUNTER
Relayed delay of start of care approval. RN asked if clinician willing to follow for orders. Advised pt does not have anything scheduled for a future appt to establish care. If willing to schedule within next 30 days, pt could likely have interim orders approval. RN states they will relay to pt and work on scheduling a visit.    TAMMI BestN, RN (she/her)  [facility] RN

## 2024-12-05 ENCOUNTER — PATIENT OUTREACH (OUTPATIENT)
Dept: CARE COORDINATION | Facility: CLINIC | Age: 54
End: 2024-12-05
Payer: COMMERCIAL

## 2025-08-16 ENCOUNTER — HEALTH MAINTENANCE LETTER (OUTPATIENT)
Age: 55
End: 2025-08-16

## 2025-08-27 ENCOUNTER — PATIENT OUTREACH (OUTPATIENT)
Dept: CARE COORDINATION | Facility: CLINIC | Age: 55
End: 2025-08-27
Payer: COMMERCIAL